# Patient Record
Sex: FEMALE | Race: BLACK OR AFRICAN AMERICAN | NOT HISPANIC OR LATINO | Employment: FULL TIME | URBAN - METROPOLITAN AREA
[De-identification: names, ages, dates, MRNs, and addresses within clinical notes are randomized per-mention and may not be internally consistent; named-entity substitution may affect disease eponyms.]

---

## 2017-10-17 ENCOUNTER — HOSPITAL ENCOUNTER (EMERGENCY)
Facility: HOSPITAL | Age: 37
Discharge: HOME | End: 2017-10-17
Attending: EMERGENCY MEDICINE
Payer: MEDICAID

## 2017-10-17 VITALS
SYSTOLIC BLOOD PRESSURE: 165 MMHG | RESPIRATION RATE: 18 BRPM | BODY MASS INDEX: 30.65 KG/M2 | HEIGHT: 63 IN | WEIGHT: 173 LBS | OXYGEN SATURATION: 99 % | DIASTOLIC BLOOD PRESSURE: 85 MMHG | HEART RATE: 94 BPM

## 2017-10-17 DIAGNOSIS — D64.9 ANEMIA, UNSPECIFIED TYPE: Primary | ICD-10-CM

## 2017-10-17 LAB
ALBUMIN SERPL BCP-MCNC: 3.4 G/DL
ALP SERPL-CCNC: 58 U/L
ALT SERPL W/O P-5'-P-CCNC: 10 U/L
ANION GAP SERPL CALC-SCNC: 7 MMOL/L
AST SERPL-CCNC: 15 U/L
BASOPHILS # BLD AUTO: 0.04 K/UL
BASOPHILS NFR BLD: 0.9 %
BILIRUB SERPL-MCNC: 0.2 MG/DL
BUN SERPL-MCNC: 8 MG/DL (ref 6–30)
BUN SERPL-MCNC: 9 MG/DL
CALCIUM SERPL-MCNC: 8.9 MG/DL
CHLORIDE SERPL-SCNC: 103 MMOL/L (ref 95–110)
CHLORIDE SERPL-SCNC: 105 MMOL/L
CO2 SERPL-SCNC: 27 MMOL/L
CREAT SERPL-MCNC: 0.6 MG/DL (ref 0.5–1.4)
CREAT SERPL-MCNC: 0.7 MG/DL
DIFFERENTIAL METHOD: ABNORMAL
EOSINOPHIL # BLD AUTO: 0.1 K/UL
EOSINOPHIL NFR BLD: 2.6 %
ERYTHROCYTE [DISTWIDTH] IN BLOOD BY AUTOMATED COUNT: 20.8 %
EST. GFR  (AFRICAN AMERICAN): >60 ML/MIN/1.73 M^2
EST. GFR  (NON AFRICAN AMERICAN): >60 ML/MIN/1.73 M^2
GLUCOSE SERPL-MCNC: 89 MG/DL (ref 70–110)
GLUCOSE SERPL-MCNC: 93 MG/DL
HCT VFR BLD AUTO: 26.7 %
HCT VFR BLD CALC: 27 %PCV (ref 36–54)
HGB BLD-MCNC: 6.8 G/DL
IMM GRANULOCYTES # BLD AUTO: 0.02 K/UL
IMM GRANULOCYTES NFR BLD AUTO: 0.4 %
LYMPHOCYTES # BLD AUTO: 1.3 K/UL
LYMPHOCYTES NFR BLD: 28.7 %
MCH RBC QN AUTO: 16.5 PG
MCHC RBC AUTO-ENTMCNC: 25.5 G/DL
MCV RBC AUTO: 65 FL
MONOCYTES # BLD AUTO: 0.5 K/UL
MONOCYTES NFR BLD: 9.9 %
NEUTROPHILS # BLD AUTO: 2.7 K/UL
NEUTROPHILS NFR BLD: 57.5 %
NRBC BLD-RTO: 0 /100 WBC
PLATELET # BLD AUTO: 264 K/UL
PMV BLD AUTO: ABNORMAL FL
POC IONIZED CALCIUM: 1.18 MMOL/L (ref 1.06–1.42)
POC TCO2 (MEASURED): 26 MMOL/L (ref 23–29)
POTASSIUM BLD-SCNC: 3.7 MMOL/L (ref 3.5–5.1)
POTASSIUM SERPL-SCNC: 3.7 MMOL/L
PROT SERPL-MCNC: 7.4 G/DL
RBC # BLD AUTO: 4.11 M/UL
SAMPLE: ABNORMAL
SODIUM BLD-SCNC: 140 MMOL/L (ref 136–145)
SODIUM SERPL-SCNC: 139 MMOL/L
WBC # BLD AUTO: 4.63 K/UL

## 2017-10-17 PROCEDURE — 99284 EMERGENCY DEPT VISIT MOD MDM: CPT | Mod: ,,, | Performed by: PHYSICIAN ASSISTANT

## 2017-10-17 PROCEDURE — 99283 EMERGENCY DEPT VISIT LOW MDM: CPT

## 2017-10-17 PROCEDURE — 85025 COMPLETE CBC W/AUTO DIFF WBC: CPT

## 2017-10-17 PROCEDURE — 80053 COMPREHEN METABOLIC PANEL: CPT

## 2017-10-17 RX ORDER — IBUPROFEN 800 MG/1
800 TABLET ORAL 3 TIMES DAILY
COMMUNITY
End: 2018-01-29 | Stop reason: ALTCHOICE

## 2017-10-17 RX ORDER — FERROUS SULFATE 325(65) MG
325 TABLET ORAL DAILY
Qty: 12 TABLET | Refills: 0 | Status: SHIPPED | OUTPATIENT
Start: 2017-10-17

## 2017-10-17 NOTE — ED TRIAGE NOTES
"Pt arrived to ED with CC of abnormal lab value - pt states "I think I need a blood transfusion."  Pt c/o weakness, fatigue, and heavy menstrual cycle. Denies CP. Pt reports palpitations and intermittent SOB. Pt unsure of fever but reports chills. Denies N/V. Denies blood in urine stool or sputum. Denies any pain.    Patient identifiers verified and correct for Amina Vazquez.    LOC: The patient is awake, alert and oriented x 4. Pt is speaking appropriately, no slurred speech.  APPEARANCE: Patient resting comfortably and in no acute distress. Pt is clean and well groomed. No JVD visible. Pt reports pain level of 0.  SKIN: Skin is warm dry and intact, and color is consistent with ethnicity. No tenting observed and capillary refill <3 seconds. No clubbing noted to nail beds. No breakdown or brusing visible and mucus membranes moist and acyanotic.  MUSCULOSKELETAL: Full range of motion present in all extremities. Hand  equal and leg strength strong +2 bilaterally.  RESPIRATORY: Pt c/o intermittent SOB. Airway is open and patent. Respirations-unlabored, regular rate, equal bilaterally on inspiration and expiration. No accessory muscle use noted. Lungs clear to auscultation in all fields bilaterally anterior and posterior.   CARDIAC: Pt c/o palpitations. Patient has regular heart rate and rhythm.  No peripheral edema noted, and patient has no c/o chest pain.  ABDOMEN: Soft and non-tender to palpation with no distention noted. Normoactive bowel sounds X4 quadrants. Pt has no complaints of abnormal bowel movements. Pt reports normal appetite.   NEUROLOGIC: Eyes open spontaneously and facial expression symmetrical. Pt behavior appropriate to situation, and pt follows commands.  Pt reports sensation present in all extremities when touched with a finger. No s/s of ischemic stroke. PERRLA  : No complaints of frequency, burning, urgency or blood in the urine. Pt currently on menstrual cycle and reports heavy " bleeding with clots - reports hx of blood transfusion r/t blood loss during menstrual cycle.

## 2017-10-17 NOTE — ED PROVIDER NOTES
"Encounter Date: 10/17/2017       History     Chief Complaint   Patient presents with    Abnormal Lab     per patient " I had a finger stick hematocrit and it was a "6" i dont know exactly"  pt states "I think I may need a blood transfusion, but maybe yall should take my blood work first.  . "     Patient is a 36-year-old female with past medical history of anemia requiring transfusions in the past and hypertension who presents to the emergency department due to a 1 month history of worsening shortness of breath and fatigue.  Patient states she has a history of heavy cycles and has had a heavy cycle this month.  Patient states that she has been having worsening shortness of breath and fatigued and associates this with potential anemia.  Patient states she has had shortness of breath in the past where she passed out at home and 2013 and required a transfusion and was found to be anemic.  Patient denies any fevers, chills, chest pain, nausea, vomiting, or any other complaints.        Review of patient's allergies indicates:  No Known Allergies  Past Medical History:   Diagnosis Date    Anemia     Hypertension      Past Surgical History:   Procedure Laterality Date     SECTION       Family History   Problem Relation Age of Onset    Hypertension Mother     Diabetes Neg Hx     Cancer Neg Hx     Stroke Neg Hx     Heart disease Neg Hx      Social History   Substance Use Topics    Smoking status: Never Smoker    Smokeless tobacco: Never Used    Alcohol use No     Review of Systems   Constitutional: Negative for activity change, appetite change, diaphoresis, fatigue and fever.   HENT: Negative for congestion, dental problem, drooling, ear pain, facial swelling, sore throat and trouble swallowing.    Eyes: Negative for pain, discharge and visual disturbance.   Respiratory: Positive for shortness of breath. Negative for apnea, cough and chest tightness.    Cardiovascular: Negative for chest pain and " palpitations.   Gastrointestinal: Negative for abdominal distention, anal bleeding, blood in stool, diarrhea, nausea and vomiting.   Endocrine: Negative for cold intolerance and polydipsia.   Genitourinary: Positive for menstrual problem. Negative for decreased urine volume, difficulty urinating, enuresis, frequency and hematuria.   Musculoskeletal: Negative for arthralgias, gait problem, myalgias and neck stiffness.   Skin: Negative for color change and pallor.   Allergic/Immunologic: Negative for environmental allergies.   Neurological: Positive for light-headedness. Negative for dizziness, syncope, numbness and headaches.   Psychiatric/Behavioral: Negative for agitation, confusion and dysphoric mood.       Physical Exam     Initial Vitals [10/17/17 1636]   BP Pulse Resp Temp SpO2   (!) 165/85 94 18 -- 99 %      MAP       111.67         Physical Exam    Nursing note and vitals reviewed.  Constitutional: She appears well-developed and well-nourished. She is not diaphoretic. No distress.   HENT:   Head: Normocephalic and atraumatic.   Neck: Normal range of motion. Neck supple.   Cardiovascular: Normal rate, regular rhythm and normal heart sounds. Exam reveals no gallop and no friction rub.    No murmur heard.  Pulmonary/Chest: Breath sounds normal. She has no wheezes. She has no rhonchi. She has no rales.   Abdominal: Soft. Bowel sounds are normal. There is no tenderness. There is no rebound and no guarding.   Musculoskeletal: Normal range of motion.   Neurological: She is alert and oriented to person, place, and time.   Skin: Skin is warm and dry. No rash noted. No erythema.   Psychiatric: She has a normal mood and affect.         ED Course   Procedures  Labs Reviewed   CBC W/ AUTO DIFFERENTIAL - Abnormal; Notable for the following:        Result Value    Hemoglobin 6.8 (*)     Hematocrit 26.7 (*)     MCV 65 (*)     MCH 16.5 (*)     MCHC 25.5 (*)     RDW 20.8 (*)     All other components within normal limits    COMPREHENSIVE METABOLIC PANEL - Abnormal; Notable for the following:     Albumin 3.4 (*)     Anion Gap 7 (*)     All other components within normal limits   ISTAT PROCEDURE - Abnormal; Notable for the following:     POC Hematocrit 27 (*)     All other components within normal limits   ISTAT CHEM8                   APC / Resident Notes:   Patient is a 36-year-old female with past medical history of anemia requiring transfusions in the past and hypertension who presents to the emergency department due to a 1 month history of worsening shortness of breath and fatigue.  Physical exam reveals female no acute distress.  Heart regular rate and rhythm.  Lungs clear to auscultation bilaterally.  Abdomen soft nontender nondistended.  Will obtain CBC and CMP.  Will transfuse if necessary.    CBC shows hemoglobin of 6.8 and hematocrit 26.7.  In reviewing patient's chart hemoglobin appears at baseline.  Patient states that she has not been taking any iron supplements due to previous constipation.  Patient will be discharged home on iron supplementation and given follow-up information for hematology as well as OB/GYN for further treatment as an outpatient.  Patient states she is interested in iron infusions given previous side effects of by mouth iron.  Plan of treatment discussed with attending physician and she is agreeable.        Scribe Attestation:   Comments:         Attending Attestation:     Physician Attestation Statement for NP/PA:   I discussed this assessment and plan of this patient with the NP/PA, but I did not personally examine the patient. The face to face encounter was performed by the NP/PA.    Other NP/PA Attestation Additions:      Medical Decision Making: Chronic anemia                 ED Course      Clinical Impression:   The encounter diagnosis was Anemia, unspecified type.    Disposition:   Disposition: Discharged  Condition: Stable                        Katelynn Mcdonnell PA-C  10/17/17 0449        Maia Mcnamara MD  10/20/17 6562

## 2018-01-29 ENCOUNTER — OFFICE VISIT (OUTPATIENT)
Dept: OCCUPATIONAL MEDICINE | Facility: CLINIC | Age: 38
End: 2018-01-29
Payer: COMMERCIAL

## 2018-01-29 VITALS
DIASTOLIC BLOOD PRESSURE: 102 MMHG | TEMPERATURE: 98 F | WEIGHT: 169 LBS | RESPIRATION RATE: 18 BRPM | HEIGHT: 63 IN | HEART RATE: 82 BPM | SYSTOLIC BLOOD PRESSURE: 142 MMHG | OXYGEN SATURATION: 98 % | BODY MASS INDEX: 29.95 KG/M2

## 2018-01-29 DIAGNOSIS — S00.83XA CONTUSION OF FACE, INITIAL ENCOUNTER: ICD-10-CM

## 2018-01-29 DIAGNOSIS — S09.93XA FACIAL INJURY, INITIAL ENCOUNTER: Primary | ICD-10-CM

## 2018-01-29 PROCEDURE — 99214 OFFICE O/P EST MOD 30 MIN: CPT | Mod: S$GLB,,, | Performed by: NURSE PRACTITIONER

## 2018-01-29 RX ORDER — HYDROCHLOROTHIAZIDE 25 MG/1
TABLET ORAL
Refills: 0 | COMMUNITY
Start: 2017-12-22 | End: 2023-05-25

## 2018-01-29 RX ORDER — IBUPROFEN 600 MG/1
600 TABLET ORAL EVERY 6 HOURS PRN
Qty: 40 TABLET | Refills: 0 | Status: SHIPPED | OUTPATIENT
Start: 2018-01-29 | End: 2018-02-08

## 2018-01-30 NOTE — PATIENT INSTRUCTIONS
Head Injury (Adult)    You have a head injury. It does not appear serious at this time. But symptoms of a more serious problem, such as a mild brain injury (concussion) or bruising or bleeding in the brain, may appear later. For this reason, you or someone caring for you will need to watch for the symptoms listed below. Once youre home, also be sure to follow any care instructions youre given.  Home care  Watch for the following symptoms  Seek emergency medical care if you have any of these symptoms over the next hours to days:   · Headache  · Nausea or vomiting  · Dizziness  · Sensitivity to light or noise  · Unusual sleepiness or grogginess  · Trouble falling asleep  · Personality changes  · Vision changes  · Memory loss  · Confusion  · Trouble walking or clumsiness  · Loss of consciousness (even for a short time)  · Inability to be awakened  · Stiff neck  · Weakness or numbness in any part of the body  · Seizures  General care  · If you were prescribed medicines for pain, use them as directed. Note: Dont take other medicines for pain without talking to your provider first.  · To help reduce swelling and pain, apply a cold source to the injured area for up to 20 minutes at a time. Do this as often as directed. Use a cold pack or bag of ice wrapped in a thin towel. Never apply a cold source directly to the skin.  · If you have cuts or scrapes as a result of your head injury, care for them as directed.  · For the next 24 hours (or longer, if instructed):  ¨ Dont drink alcohol or use sedatives or other medicines that make you sleepy.  ¨ Dont drive or operate machinery.  ¨ Dont do anything strenuous, such as heavy lifting or straining.  ¨ Limit tasks that require concentration. This includes reading, using a smartphone or computer, watching TV, and playing video games.  ¨ Dont return to sports or other activities that could result in another head injury.  Follow-up care  Follow up with your healthcare  provider, or as directed. If imaging tests were done, they will be reviewed by a doctor. You will be told the results and any new findings that may affect your care.  When to seek medical advice  Call your healthcare provider right away if any of these occur:  · Pain doesnt get better or worsens  · New or increased swelling or bruising  · Fever of 100.4°F (38°C) or higher, or as directed by your provider  · Increased redness, warmth, drainage, or bleeding from the injured area  · Fluid drainage or bleeding from the nose or ears  · Any depression or bony abnormality in the injured area  Date Last Reviewed: 9/26/2015 © 2000-2017 [employer]. [address] [place] [address], [place] [address]. All rights reserved. This information is not intended as a substitute for professional medical care. Always follow your healthcare professional's instructions.        Facial Contusion  A contusion is another word for a bruise. It happens when small blood vessels break open and leak blood into the nearby area. A facial contusion can result from a bump, hit, or fall. This may happen during sports or an accident. Symptoms of a contusion often include changes in skin color (bruising), swelling, and pain.   The swelling from the contusion should decrease in a few days. Bruising and pain may take several weeks to go away.   Home care  · If you have been prescribed medicines for pain, take them as directed.  · To help reduce swelling and pain, wrap a cold pack or bag of frozen peas in a thin towel. Put it on the injured area for up to 20 minutes. Do this a few times a day until the swelling goes down.   · If you have scrapes or cuts on your face requiring stiches or other closures, care for them as directed.  · For the next 24 hours (or longer if instructed):  ¨ Dont drink alcohol, or use sedatives or medicines that make you sleepy.  ¨ Dont drive or operate machinery.  ¨ Avoid doing anything strenuous. Dont lift or  strain.  ¨ Do not return to sports or other activity that could result in another head injury.  Note about concussion  Because the injury was to your head, it is possible that a concussion (mild brain injury) could result. You don't have signs of a concussion at this time. But symptoms can show up later. Be alert for signs and symptoms of a concussion. Seek emergency medical care if any of these develop over the next hours to days:  · Headache  · Nausea or vomiting  · Dizziness  · Sensitivity to light or noise  · Unusual sleepiness or grogginess  · Trouble falling asleep  · Personality changes  · Vision changes  · Memory loss  · Confusion  · Trouble walking or clumsiness  · Loss of consciousness (even for a short time)  · Inability to be awakened   Follow-up care  Follow up with your healthcare provider or our staff as directed.  When to seek medical advice  Call your healthcare provider right away if any of these occur:  · Swelling or pain that gets worse, not better  · New swelling or pain  · Warmth or drainage from the swollen area or from cuts or scrapes  · Fluid drainage or bleeding from the nose or ears  · Fever of 100.4ºF (38ºC) or higher, or as directed by your healthcare provider  Date Last Reviewed: 5/7/2015  © 1489-0879 [employer]. [address] [place] [address], [place] [address]. All rights reserved. This information is not intended as a substitute for professional medical care. Always follow your healthcare professional's instructions.

## 2018-01-30 NOTE — PROGRESS NOTES
"Subjective:       Patient ID: Amina Vazquez is a 37 y.o. female.    Vitals:  height is 5' 3" (1.6 m) and weight is 76.7 kg (169 lb). Her temperature is 98.2 °F (36.8 °C). Her blood pressure is 154/100 (abnormal) and her pulse is 82. Her respiration is 18 and oxygen saturation is 98%.     Chief Complaint: Facial Injury (on january 25th)    Pt was working at [employer] when a faucet display fell and hit her below her nose on the left side of her face. Pt says her face was swollen after the fact and that she had headaches. Pt says the headaches are still present and blurry vision. Pt said she did not bleed at all though. Pt says she felt light headed after it happened. Pt has taken excedrin and ibuprofen yesterday. Pt is a local that works at [employer] [place]      Facial Injury    The incident occurred 3 to 5 days ago. The injury mechanism was a direct blow. There was no loss of consciousness. There was no blood loss. The quality of the pain is described as aching. The pain is at a severity of 9/10. The pain is moderate. The pain has been constant since the injury. Associated symptoms include blurred vision and headaches. Pertinent negatives include no memory loss, numbness, tinnitus, vomiting or weakness. She has tried NSAIDs for the symptoms. The treatment provided mild relief.     Review of Systems   Constitution: Negative for chills, decreased appetite and weakness.   HENT: Negative for congestion and tinnitus.    Eyes: Positive for blurred vision.   Respiratory: Negative for cough.    Gastrointestinal: Negative for constipation, diarrhea, nausea and vomiting.   Neurological: Positive for headaches and light-headedness. Negative for numbness.   Psychiatric/Behavioral: Negative for memory loss.       Objective:      Physical Exam   Constitutional: She is oriented to person, place, and time. She appears well-developed and well-nourished. She is cooperative.  Non-toxic appearance. She does not appear ill. No " "distress.   HENT:   Head: Normocephalic and atraumatic. Head is without abrasion, without contusion and without laceration.       Right Ear: Hearing, tympanic membrane, external ear and ear canal normal. No hemotympanum.   Left Ear: Hearing, tympanic membrane, external ear and ear canal normal. No hemotympanum.   Nose: Nose normal. No mucosal edema, rhinorrhea or nasal deformity. No epistaxis. Right sinus exhibits no maxillary sinus tenderness and no frontal sinus tenderness. Left sinus exhibits no maxillary sinus tenderness and no frontal sinus tenderness.   Mouth/Throat: Uvula is midline, oropharynx is clear and moist and mucous membranes are normal. No trismus in the jaw. Normal dentition. No uvula swelling. No posterior oropharyngeal erythema.   Patient reports that her symptoms were so bad on Thursday and Friday that she asked to go to an Ed, however she was told she needed to go to "our doctors".  She waited to come to the Urgent care today.  Only complaint at this time is upper jaw pain.  Other symptoms have resolved    Eyes: Conjunctivae, EOM and lids are normal. Pupils are equal, round, and reactive to light. Right eye exhibits no discharge. Left eye exhibits no discharge. No scleral icterus.   Sclera clear bilat   Neck: Trachea normal, normal range of motion, full passive range of motion without pain and phonation normal. Neck supple. No spinous process tenderness and no muscular tenderness present. No neck rigidity. No tracheal deviation present.   Cardiovascular: Normal rate, regular rhythm, normal heart sounds, intact distal pulses and normal pulses.    Pulmonary/Chest: Effort normal and breath sounds normal. No respiratory distress.   Abdominal: Soft. Normal appearance and bowel sounds are normal. She exhibits no distension, no pulsatile midline mass and no mass. There is no tenderness.   Musculoskeletal: Normal range of motion. She exhibits no edema or deformity.   Neurological: She is alert and " oriented to person, place, and time. She has normal strength. No cranial nerve deficit or sensory deficit. She exhibits normal muscle tone. She displays no seizure activity. Coordination normal. GCS eye subscore is 4. GCS verbal subscore is 5. GCS motor subscore is 6.   No neuro deficits noted today .  Incident happened On Thursday early afternoon and she stayed at work for 2 additional hours    Skin: Skin is warm, dry and intact. Capillary refill takes less than 2 seconds. No abrasion, no bruising, no burn, no ecchymosis and no laceration noted. She is not diaphoretic. No pallor.   Psychiatric: She has a normal mood and affect. Her speech is normal and behavior is normal. Judgment and thought content normal. Cognition and memory are normal.   Nursing note and vitals reviewed.    No fracture noted to facial bones.  Minimal fluid noted to sinuses.   Assessment:       1. Facial injury, initial encounter    2. Contusion of face, initial encounter        Plan:         Facial injury, initial encounter  -     X-Ray Facial Bones  3 Or More View; Future; Expected date: 01/29/2018    Contusion of face, initial encounter

## 2018-06-27 ENCOUNTER — TELEPHONE (OUTPATIENT)
Dept: OBSTETRICS AND GYNECOLOGY | Facility: CLINIC | Age: 38
End: 2018-06-27

## 2018-06-27 DIAGNOSIS — N91.2 AMENORRHEA: Primary | ICD-10-CM

## 2018-06-27 NOTE — TELEPHONE ENCOUNTER
----- Message from Mona Guthrie sent at 6/27/2018  1:41 PM CDT -----  Contact: VERONICA XIE [[id number]]      Can the clinic reply in [facility]: no    Who Called: VERONICA XIE [[id number]]    Date of Positive Preg Test: 6/18    1st day of Last Menstrual Cycle: 5/21    List Any Difficulties: no    What Number to Call Back: [phone]

## 2018-07-02 ENCOUNTER — TELEPHONE (OUTPATIENT)
Dept: OBSTETRICS AND GYNECOLOGY | Facility: CLINIC | Age: 38
End: 2018-07-02

## 2018-07-02 NOTE — TELEPHONE ENCOUNTER
----- Message from Prachi Swain sent at 7/2/2018  3:13 PM CDT -----  Contact: Self   New Ob LMP 04/21/2018 Call back number is [phone].She was a pt of [facility] before

## 2018-07-02 NOTE — TELEPHONE ENCOUNTER
Unable to reach patient on this matter. LMOVM to call the office back        Everything entered with last weeks call

## 2018-07-10 ENCOUNTER — TELEPHONE (OUTPATIENT)
Dept: OBSTETRICS AND GYNECOLOGY | Facility: CLINIC | Age: 38
End: 2018-07-10

## 2018-07-10 NOTE — TELEPHONE ENCOUNTER
----- Message from Yvette Moscoso sent at 7/10/2018 12:59 PM CDT -----  Contact: Amina  Name of Who is Calling: Amina      What is the request in detail: Patient was returning a missed call back from the staff concerning her inital ob appointment       Can the clinic reply by [facility]: no      What Number to Call Back if not in [facility]: [phone]

## 2018-07-18 ENCOUNTER — TELEPHONE (OUTPATIENT)
Dept: OBSTETRICS AND GYNECOLOGY | Facility: CLINIC | Age: 38
End: 2018-07-18

## 2018-07-18 NOTE — TELEPHONE ENCOUNTER
----- Message from Mona Guthrie sent at 7/18/2018 10:59 AM CDT -----  Contact: VERONICA XIE [[id number]]            Name of Who is Calling: VERONICA XIE [[id number]]      What is the request in detail:  Pt returning call back to schedule first initial appointment       Can the clinic reply by [facility]: no      What Number to Call Back if not in [facility]: [phone]

## 2018-08-01 ENCOUNTER — PROCEDURE VISIT (OUTPATIENT)
Dept: OBSTETRICS AND GYNECOLOGY | Facility: CLINIC | Age: 38
End: 2018-08-01
Payer: MEDICAID

## 2018-08-01 DIAGNOSIS — O36.80X0 ENCOUNTER TO DETERMINE FETAL VIABILITY OF PREGNANCY, SINGLE OR UNSPECIFIED FETUS: ICD-10-CM

## 2018-08-01 DIAGNOSIS — N91.2 AMENORRHEA: ICD-10-CM

## 2018-08-01 DIAGNOSIS — Z36.89 ESTABLISH GESTATIONAL AGE, ULTRASOUND: ICD-10-CM

## 2018-08-01 PROCEDURE — 76801 OB US < 14 WKS SINGLE FETUS: CPT | Mod: PBBFAC | Performed by: OBSTETRICS & GYNECOLOGY

## 2018-08-01 PROCEDURE — 76801 OB US < 14 WKS SINGLE FETUS: CPT | Mod: 26,S$PBB,, | Performed by: OBSTETRICS & GYNECOLOGY

## 2018-08-01 NOTE — PROCEDURES
Procedures   Obstetrical ultrasound completed today.  See report in imaging section of [facility].

## 2018-08-17 ENCOUNTER — TELEPHONE (OUTPATIENT)
Dept: OBSTETRICS AND GYNECOLOGY | Facility: CLINIC | Age: 38
End: 2018-08-17

## 2018-08-17 NOTE — TELEPHONE ENCOUNTER
Informed patient that dr linares is not taking new ob patients at this time. Patient was scheduled with Dr. Pedersen

## 2018-08-17 NOTE — TELEPHONE ENCOUNTER
----- Message from Charlotte Bell sent at 8/17/2018  6:58 AM CDT -----  Contact: self  Message from [facility], System Message sent at 8/16/2018 11:01 AM CDT -----    Appointment Request From: Amina Vazquez    With Provider: Dr Liana Valdes    Preferred Date Range: Any    Preferred Times: Any time    Reason for visit: Existing Patient    Comments:  Prenatal

## 2018-09-10 ENCOUNTER — TELEPHONE (OUTPATIENT)
Dept: OBSTETRICS AND GYNECOLOGY | Facility: CLINIC | Age: 38
End: 2018-09-10

## 2018-09-10 NOTE — TELEPHONE ENCOUNTER
----- Message from Vivi Burger sent at 9/10/2018  1:02 PM CDT -----  Contact: pt   Can the clinic reply in [facility]: YES     Who Called: VERONICA XIE [[id number]]     Date of Positive Preg Test: 8/1/18      1st day of Last Menstrual Cycle: 05/21     List Any Difficulties: Fibroids      What Number to Call Back: [phone].

## 2018-09-18 ENCOUNTER — INITIAL PRENATAL (OUTPATIENT)
Dept: OBSTETRICS AND GYNECOLOGY | Facility: CLINIC | Age: 38
End: 2018-09-18
Payer: MEDICAID

## 2018-09-18 VITALS
BODY MASS INDEX: 32.02 KG/M2 | DIASTOLIC BLOOD PRESSURE: 78 MMHG | SYSTOLIC BLOOD PRESSURE: 132 MMHG | WEIGHT: 180.75 LBS

## 2018-09-18 DIAGNOSIS — Z98.891 HISTORY OF CESAREAN DELIVERY: ICD-10-CM

## 2018-09-18 DIAGNOSIS — I10 CHRONIC HYPERTENSION: ICD-10-CM

## 2018-09-18 DIAGNOSIS — Z34.81 NORMAL PREGNANCY IN MULTIGRAVIDA IN FIRST TRIMESTER: ICD-10-CM

## 2018-09-18 DIAGNOSIS — Z32.01 POSITIVE PREGNANCY TEST: Primary | ICD-10-CM

## 2018-09-18 DIAGNOSIS — Z36.89 ENCOUNTER FOR FETAL ANATOMIC SURVEY: ICD-10-CM

## 2018-09-18 PROCEDURE — 99213 OFFICE O/P EST LOW 20 MIN: CPT | Mod: PBBFAC,TH | Performed by: OBSTETRICS & GYNECOLOGY

## 2018-09-18 PROCEDURE — 99203 OFFICE O/P NEW LOW 30 MIN: CPT | Mod: S$PBB,TH,, | Performed by: OBSTETRICS & GYNECOLOGY

## 2018-09-18 PROCEDURE — 99999 PR PBB SHADOW E&M-EST. PATIENT-LVL III: CPT | Mod: PBBFAC,,, | Performed by: OBSTETRICS & GYNECOLOGY

## 2018-09-18 RX ORDER — NIFEDIPINE 30 MG/1
TABLET, EXTENDED RELEASE ORAL
Refills: 1 | COMMUNITY
Start: 2018-07-03 | End: 2023-05-25

## 2018-09-18 RX ORDER — NITROFURANTOIN 25; 75 MG/1; MG/1
CAPSULE ORAL
Refills: 0 | COMMUNITY
Start: 2018-07-03

## 2018-09-18 NOTE — PROGRESS NOTES
"CC: Amenorrhea     HPI: Amina Vazquez is a 37 y.o. , presents today for above. Has not seen any other provider for this pregnancy. Patient's last menstrual period was 2018. (exact date).     Not planned pregnancy. Not using birth control. Had dating ultrasound earlier this pregnancy on 18, 11 weeks - consistent with LMP. Has history of  delivery x 2. First  was for "genital warts." Second was elective repeat  delivery.     Has HTN, on HCTZ 25 mg daily and Nifedipine. New diagnosis since last pregnancy.      Complains of discomfort at night. Takes gas-ex at night. Having increased pressure. No nausea, vomiting, bleeding.      SOCIAL HISTORY: Works at [employer]. Boyfriend, Quinton, is a contractor. Denies emotional/mental/physical/sexual violence or abuse. Feels safe at home. Accompanied today by her daughter. Some stress at home as her 19 yo daughter just moved out. They have been through counseling before.     GYN History:  Last pap: 2016 normal   Denies history of abnormal pap smears.   Denies history of hepatitis B, C, HIV, syphilis, gonorrhea/chlamydia.   Denies History of genital HSV. Does report a history of genital warts (which was reportedly indication for C/S in G1). No warts since that pregnancy.   Cycles regular, every 28 days    Review of patient's allergies indicates:  No Known Allergies  Past Medical History:   Diagnosis Date    Anemia     Hypertension      Past Surgical History:   Procedure Laterality Date     SECTION      x 2     Past Surgical History:   Procedure Laterality Date     SECTION      x 2     OB History    Para Term  AB Living   4 2 2   1 2   SAB TAB Ectopic Multiple Live Births                  # Outcome Date GA Lbr Ricco/2nd Weight Sex Delivery Anes PTL Lv   4 Current            3 AB            2 Term            1 Term                 OB History      Para Term  AB Living    4 2 2          SAB TAB " Ectopic Multiple Live Births                     Social History     Socioeconomic History    Marital status: Single     Spouse name: Not on file    Number of children: Not on file    Years of education: Not on file    Highest education level: Not on file   Social Needs    Financial resource strain: Not on file    Food insecurity - worry: Not on file    Food insecurity - inability: Not on file    Transportation needs - medical: Not on file    Transportation needs - non-medical: Not on file   Occupational History    Not on file   Tobacco Use    Smoking status: Never Smoker    Smokeless tobacco: Never Used   Substance and Sexual Activity    Alcohol use: No    Drug use: No    Sexual activity: Yes     Partners: Female   Other Topics Concern    Not on file   Social History Narrative    Not on file     Family History   Problem Relation Age of Onset    Hypertension Mother     Diabetes Neg Hx     Cancer Neg Hx     Stroke Neg Hx     Heart disease Neg Hx      Social History     Substance and Sexual Activity   Sexual Activity Yes    Partners: Female     GENETIC SCREENING   Patient's age 35 years or older as of estimated date of delivery? yes  Nural tube defect (meningomyelocele, spina bifida, or anencephaly)? No  Down syndrome? No  Yonis-Sachs (Ashkenazi Sikhism, Cajun, Japanese [place])? No  Sickle cell disease or trait ()? No  Hemophilia or other blood disorders? No  Cystic fibrosis? No  Muscular dystrophy? No  Jennifer's chorea? No  Thalassemia (Italian, Greek, Mediterranean, or  background) MCV less than 80? No  Congenital heart defect? No  Mental retardation/autism? No  Other inherited genetic or chromosomal disorder? No  Maternal metabolic disorder (e.g. type 1 diabetes, PKU)? No  Patient or baby's father had a child with birth defects not listed above? No  Recurrent pregnancy loss or a stillbirth: No  Medications (including supplements, vitamins, herbs or OTC drugs)/illicit/recreational  drugs/alcohol since last menstrual period? None    ROS:    Constitutional/Gen: Feeling well  Psych: Some stress   CV/vasc: Denies heart palpitations or edema  Resp: Denies SOB or dyspnea  GI: Denies constipation, diarrhea, or vomiting. No nausea.  : Some lower abdominal discomfort   MS: Denies weakness, soreness, or changes in ROM    OBJECTIVE:  /78   Wt 82 kg (180 lb 12.4 oz)   LMP 2018   BMI 32.02 kg/m²   Constitutional/Gen: NAD  Head: normocephalic  Skin: warm and dry w/o rash  Lung: No increased work of breathing  Heart: Normal heart rateT  Abdomen: soft, non tender, no hernia, well healed pfannenstiel incisions  Pelvic exam: deferred  Extremities: normal ROM  Neurologic: A&O x 4  Psych: affect appropriate and without signs of mood, thought or memory difficulty appreciated    ASSESSMENT: 37 y.o. female  with amenorrhea. Pregnancy at 17w1d by LMP c/w 11 week scan.     PLAN:  1. Amenorrhea  -- Routine serum and urine prenatal labs today     2. Routine Gyn care  -- Last pap , normal per patient. Unclear if she had HPV co-testing.   -- Needs pap and pelvic exam at next visit.     3.  Body mass index is 32.02 kg/m².     Underweight Less than 18.5 28-40    Normal Weight 18.5-24.9  25-35    Overweight 25-29.9  15-25    Obese   30 and greater  11-20     Patient Active Problem List    Diagnosis Date Noted    Normal pregnancy in multigravida in first trimester 2018     Dated by: LMP consistent with 11 week US  Rh positive  PP Birth Control: Considering tubal ligation. Need to continue to discuss.  NOB visit at 17 weeks. Desires QUAD screen.             Chronic hypertension 2018     New diagnosis, did not have with her other pregnancies  Currently on HCTZ 25 mg daily  and Nifedipine 30 mg XR daily, will continue  #Recommend ASA 81 mg for pre-eclampsia risk reduction  #Will obtain baseline labs, CBC, CMP and urine p/c  #We discussed heightened surveillance in third trimester with  "growth scans and  testing. Delivery pending control.       History of  delivery 2018     History of C/S in G1 for "genital warts"  Elective repeat in G2      Uterine fibroid 2016    Iron deficiency anemia due to chronic blood loss 2013    Pica in adults 2013     RTO in 3 weeks for anatomy scan and prenatal visit.     Tabatha Wray MD  Obstetrics and Gynecology  [facility]          "

## 2018-10-09 ENCOUNTER — PROCEDURE VISIT (OUTPATIENT)
Dept: MATERNAL FETAL MEDICINE | Facility: CLINIC | Age: 38
End: 2018-10-09
Payer: MEDICAID

## 2018-10-09 DIAGNOSIS — Z36.89 ENCOUNTER FOR FETAL ANATOMIC SURVEY: ICD-10-CM

## 2018-10-09 DIAGNOSIS — O34.12 UTERINE FIBROIDS AFFECTING PREGNANCY IN SECOND TRIMESTER: ICD-10-CM

## 2018-10-09 DIAGNOSIS — Z36.89 ENCOUNTER FOR ULTRASOUND TO CHECK FETAL GROWTH: Primary | ICD-10-CM

## 2018-10-09 DIAGNOSIS — O09.522 ELDERLY MULTIGRAVIDA IN SECOND TRIMESTER: ICD-10-CM

## 2018-10-09 DIAGNOSIS — O10.912 PRE-EXISTING HYPERTENSION DURING PREGNANCY IN SECOND TRIMESTER, UNSPECIFIED PRE-EXISTING HYPERTENSION TYPE: ICD-10-CM

## 2018-10-09 DIAGNOSIS — D25.9 UTERINE FIBROIDS AFFECTING PREGNANCY IN SECOND TRIMESTER: ICD-10-CM

## 2018-10-09 PROCEDURE — 99499 UNLISTED E&M SERVICE: CPT | Mod: S$PBB,,, | Performed by: OBSTETRICS & GYNECOLOGY

## 2018-10-09 PROCEDURE — 76811 OB US DETAILED SNGL FETUS: CPT | Mod: PBBFAC | Performed by: OBSTETRICS & GYNECOLOGY

## 2018-10-09 PROCEDURE — 76811 OB US DETAILED SNGL FETUS: CPT | Mod: 26,S$PBB,, | Performed by: OBSTETRICS & GYNECOLOGY

## 2018-10-14 PROBLEM — O09.522 ELDERLY MULTIGRAVIDA IN SECOND TRIMESTER: Status: ACTIVE | Noted: 2018-10-14

## 2018-10-15 ENCOUNTER — TELEPHONE (OUTPATIENT)
Dept: OBSTETRICS AND GYNECOLOGY | Facility: CLINIC | Age: 38
End: 2018-10-15

## 2018-10-15 NOTE — TELEPHONE ENCOUNTER
Patient purposely missed appointments. She walk into clinic and stated she wanted a new doctor, because she didn't like . She schedule at  with another provider, message routed to .

## 2018-10-15 NOTE — TELEPHONE ENCOUNTER
----- Message from Tabatha Wray MD sent at 10/14/2018 10:22 AM CDT -----  This patient never had her prenatal labs drawn. Does someone mind calling her to see if she can get those done?     Thanks!    Tabatha Wray MD  Obstetrics and Gynecology  [facility]

## 2018-10-16 ENCOUNTER — TELEPHONE (OUTPATIENT)
Dept: OBSTETRICS AND GYNECOLOGY | Facility: CLINIC | Age: 38
End: 2018-10-16

## 2018-10-16 NOTE — TELEPHONE ENCOUNTER
----- Message from Tabatha Wray MD sent at 10/15/2018  7:12 PM CDT -----  Hi,    I called ilda tonight to double check about her prenatal labs. She is hoping to reschedule these. She also needs a fu prenatal visit. I am happy to see her, (or it can be with anyone else if she prefers :)). Do you mind calling her tomorrow to arrange the labs, prenatal visit?    Thanks!  Tabatha Wray MD  Obstetrics and Gynecology  [facility]

## 2019-01-02 ENCOUNTER — TELEPHONE (OUTPATIENT)
Dept: OBSTETRICS AND GYNECOLOGY | Facility: CLINIC | Age: 39
End: 2019-01-02

## 2019-01-02 NOTE — TELEPHONE ENCOUNTER
Called patient to set up a prenatal visit; no answer. Left patient a message to return call for further assistance with scheduling appointment.

## 2019-01-02 NOTE — TELEPHONE ENCOUNTER
----- Message from Tabatha Wray MD sent at 1/2/2019 11:14 AM CST -----  Contact: [facility]  Thank you! Does someone mind calling her to see if she would like to schedule a prenatal visit. I don't think she has been seen since September.     Thanks!  Tabatha    ----- Message -----  From: Jordan Dorman MA  Sent: 1/2/2019   8:40 AM  To: Tabatha Wray MD        ----- Message -----  From: Angélica Celaya  Sent: 1/2/2019   8:12 AM  To: Jeansonne Julie Staff    Pt was a no show for her [facility] appt. sh

## 2020-01-09 ENCOUNTER — HOSPITAL ENCOUNTER (EMERGENCY)
Facility: OTHER | Age: 40
Discharge: HOME | End: 2020-01-09
Attending: EMERGENCY MEDICINE
Payer: MEDICAID

## 2020-01-09 VITALS
HEIGHT: 63 IN | DIASTOLIC BLOOD PRESSURE: 84 MMHG | TEMPERATURE: 99 F | BODY MASS INDEX: 29.59 KG/M2 | RESPIRATION RATE: 16 BRPM | OXYGEN SATURATION: 100 % | HEART RATE: 71 BPM | SYSTOLIC BLOOD PRESSURE: 165 MMHG | WEIGHT: 167 LBS

## 2020-01-09 DIAGNOSIS — I10 ESSENTIAL HYPERTENSION: ICD-10-CM

## 2020-01-09 DIAGNOSIS — R03.0 ELEVATED BLOOD PRESSURE READING: ICD-10-CM

## 2020-01-09 DIAGNOSIS — R51.9 FRONTAL HEADACHE: Primary | ICD-10-CM

## 2020-01-09 LAB
B-HCG UR QL: NEGATIVE
CTP QC/QA: YES

## 2020-01-09 PROCEDURE — 25000003 PHARM REV CODE 250: Performed by: EMERGENCY MEDICINE

## 2020-01-09 PROCEDURE — 99283 EMERGENCY DEPT VISIT LOW MDM: CPT

## 2020-01-09 PROCEDURE — 81025 URINE PREGNANCY TEST: CPT | Performed by: EMERGENCY MEDICINE

## 2020-01-09 RX ORDER — NAPROXEN 500 MG/1
500 TABLET ORAL
Status: COMPLETED | OUTPATIENT
Start: 2020-01-09 | End: 2020-01-09

## 2020-01-09 RX ADMIN — NAPROXEN 500 MG: 500 TABLET ORAL at 03:01

## 2020-01-09 NOTE — ED TRIAGE NOTES
"Pt presents to ED with c/o a generalized headache that feels "like a bunch of built up pressure". Reports I can tell when my pressure is high, I get this headache and get a little dizzy. States she took Excedrin about an hour ago and her headache is starting to improve. Denies fever, N/V/D, chest pain, SOB, blurred vision, and palpitations  "

## 2020-01-09 NOTE — ED PROVIDER NOTES
"Encounter Date: 2020    SCRIBE #1 NOTE: I, Tammy Estrella, am scribing for, and in the presence of, Dr. Laurent.       History     Chief Complaint   Patient presents with    Headache     "My pressure keeps going up and I have these headaches" x couple of days     Time seen by provider: 3:14 AM    This is a 39 y.o. female with hx of HTN who presents with complaint of intermittent generalized headache for three days. Headache has improved from 8/10 severity to 5/10 currently. Pt delivered a child 11 months ago and went back to work two weeks ago. 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 She states her blood pressure has been in the 140s/80s at baseline, but has been elevated since going back to work. She reports blurry vision and fatigue. She denies chest pain, shortness of breath, N/V, sensitivity to light or sound, or fevers. She took an Excedrin one hour ago. Her hydrochlorothiazide was reduced from 25 mg to 12.5 mg four months ago. She missed one HCTZ dose in the last few days, but took it today.    The history is provided by the patient.     Review of patient's allergies indicates:  No Known Allergies  Past Medical History:   Diagnosis Date    Anemia     Hypertension      Past Surgical History:   Procedure Laterality Date     SECTION      x 2     Family History   Problem Relation Age of Onset    Hypertension Mother     Diabetes Neg Hx     Cancer Neg Hx     Stroke Neg Hx     Heart disease Neg Hx      Social History     Tobacco Use    Smoking status: Never Smoker    Smokeless tobacco: Never Used   Substance Use Topics    Alcohol use: No    Drug use: No     Review of Systems   Constitutional: Positive for fatigue. Negative for fever.   HENT: Negative for sore throat.         Negative for sensitivity to sound.   Eyes: Positive for visual disturbance. Negative for photophobia.   Respiratory: Negative for shortness of " breath.    Cardiovascular: Negative for chest pain.   Gastrointestinal: Negative for nausea and vomiting.   Genitourinary: Negative for dysuria.   Skin: Negative for rash.   Neurological: Positive for headaches. Negative for weakness.   Psychiatric/Behavioral: Negative for confusion.       Physical Exam     Initial Vitals [01/09/20 0249]   BP Pulse Resp Temp SpO2   (!) 191/103 73 16 99 °F (37.2 °C) 100 %      MAP       --         Physical Exam    Nursing note and vitals reviewed.  Constitutional: She appears well-developed and well-nourished. She is not diaphoretic. No distress.   HENT:   Head: Normocephalic and atraumatic.   Eyes: Conjunctivae and EOM are normal. Pupils are equal, round, and reactive to light. No scleral icterus.   Neck: Normal range of motion. Neck supple.   Cardiovascular: Normal rate, regular rhythm and normal heart sounds. Exam reveals no gallop and no friction rub.    No murmur heard.  Pulmonary/Chest: Breath sounds normal. No respiratory distress. She has no wheezes. She has no rhonchi. She has no rales.   Musculoskeletal: Normal range of motion. She exhibits no edema or tenderness.   Neurological: She is alert and oriented to person, place, and time. No cranial nerve deficit.   Skin: Skin is warm and dry.   Psychiatric: She has a normal mood and affect. Her behavior is normal. Judgment and thought content normal.         ED Course   Procedures  Labs Reviewed   POCT URINE PREGNANCY          Imaging Results    None          Medical Decision Making:   History:   Old Medical Records: I decided to obtain old medical records.  Clinical Tests:   Lab Tests: Ordered and Reviewed    Additional MDM:   Comments: 39-year-old female with history of hypertension currently on hydrochlorothiazide 12.5 mg only presents complaining of a frontal headache and concern for her blood pressure being elevated and because of her pain. She denies any other associated symptoms. Blood pressure was markedly elevated in  triage but it did improve once placed in the room without any further interventions.  Physical exam unremarkable. I suspect that her blood pressure elevation is in part due to the stress of returning to work 2 weeks ago after being off for several months.  She also reports working nights over the past 2 weeks and getting very little sleep on a daily basis.  While in the emergency department she received naproxen and on reassessment reported her headache had resolved.  Patient was urged to keep a log of her blood pressure over the next 3-5 days to bring with her to her next primary care doctor's appointment.  She was given precautions for seeking immediate re-evaluation, counseled supportive care for home, and instructed to follow up with her primary care doctor for further management of her hypertension/medication adjustments..          Scribe Attestation:   Scribe #1: I performed the above scribed service and the documentation accurately describes the services I performed. I attest to the accuracy of the note.    Attending Attestation:           Physician Attestation for Scribe:  Physician Attestation Statement for Scribe #1: I, Dr. Laurent, reviewed documentation, as scribed by Tammy Estrella in my presence, and it is both accurate and complete.                               Clinical Impression:     1. Frontal headache    2. Essential hypertension    3. Elevated blood pressure reading                                Ginna Laurent MD  01/09/20 0512

## 2020-12-15 ENCOUNTER — CLINICAL SUPPORT (OUTPATIENT)
Dept: URGENT CARE | Facility: CLINIC | Age: 40
End: 2020-12-15
Payer: MEDICAID

## 2020-12-15 DIAGNOSIS — Z20.822 ENCOUNTER FOR LABORATORY TESTING FOR COVID-19 VIRUS: Primary | ICD-10-CM

## 2020-12-15 LAB
CTP QC/QA: YES
SARS-COV-2 RDRP RESP QL NAA+PROBE: NEGATIVE

## 2020-12-15 PROCEDURE — U0002 COVID-19 LAB TEST NON-CDC: HCPCS | Mod: QW,S$GLB,, | Performed by: FAMILY MEDICINE

## 2020-12-15 PROCEDURE — U0002: ICD-10-PCS | Mod: QW,S$GLB,, | Performed by: FAMILY MEDICINE

## 2021-04-26 ENCOUNTER — PATIENT MESSAGE (OUTPATIENT)
Dept: RESEARCH | Facility: HOSPITAL | Age: 41
End: 2021-04-26

## 2021-08-02 ENCOUNTER — HOSPITAL ENCOUNTER (EMERGENCY)
Facility: OTHER | Age: 41
Discharge: HOME | End: 2021-08-02
Attending: EMERGENCY MEDICINE
Payer: MEDICAID

## 2021-08-02 VITALS
TEMPERATURE: 97 F | HEART RATE: 86 BPM | DIASTOLIC BLOOD PRESSURE: 78 MMHG | RESPIRATION RATE: 17 BRPM | OXYGEN SATURATION: 99 % | SYSTOLIC BLOOD PRESSURE: 135 MMHG

## 2021-08-02 DIAGNOSIS — J01.90 ACUTE NON-RECURRENT SINUSITIS, UNSPECIFIED LOCATION: Primary | ICD-10-CM

## 2021-08-02 DIAGNOSIS — Z01.84 COVID-19 VIRUS ANTIBODY NEGATIVE: ICD-10-CM

## 2021-08-02 LAB
CTP QC/QA: YES
SARS-COV-2 RDRP RESP QL NAA+PROBE: NEGATIVE

## 2021-08-02 PROCEDURE — U0002 COVID-19 LAB TEST NON-CDC: HCPCS | Performed by: PHYSICIAN ASSISTANT

## 2021-08-02 PROCEDURE — 99284 EMERGENCY DEPT VISIT MOD MDM: CPT | Mod: 25

## 2021-08-02 RX ORDER — METHYLPREDNISOLONE 4 MG/1
TABLET ORAL
Qty: 1 PACKAGE | Refills: 0 | Status: SHIPPED | OUTPATIENT
Start: 2021-08-02 | End: 2021-08-23

## 2021-08-02 RX ORDER — GUAIFENESIN AND DEXTROMETHORPHAN HYDROBROMIDE 1200; 60 MG/1; MG/1
1 TABLET, EXTENDED RELEASE ORAL 2 TIMES DAILY PRN
Qty: 30 TABLET | Refills: 0 | Status: SHIPPED | OUTPATIENT
Start: 2021-08-02 | End: 2021-08-12

## 2021-08-02 RX ORDER — CETIRIZINE HYDROCHLORIDE 10 MG/1
10 TABLET ORAL DAILY
Qty: 30 TABLET | Refills: 0 | Status: SHIPPED | OUTPATIENT
Start: 2021-08-02 | End: 2022-08-02

## 2023-03-21 ENCOUNTER — HOSPITAL ENCOUNTER (EMERGENCY)
Facility: OTHER | Age: 43
Discharge: LEFT BEFORE DISCHARGE | End: 2023-03-21
Payer: MEDICAID

## 2023-03-21 VITALS
DIASTOLIC BLOOD PRESSURE: 98 MMHG | SYSTOLIC BLOOD PRESSURE: 175 MMHG | HEART RATE: 68 BPM | RESPIRATION RATE: 16 BRPM | OXYGEN SATURATION: 100 % | TEMPERATURE: 99 F

## 2023-03-21 DIAGNOSIS — R07.9 CHEST PAIN: ICD-10-CM

## 2023-03-21 LAB
ALBUMIN SERPL BCP-MCNC: 4 G/DL (ref 3.5–5.2)
ALP SERPL-CCNC: 50 U/L (ref 55–135)
ALT SERPL W/O P-5'-P-CCNC: 13 U/L (ref 10–44)
ANION GAP SERPL CALC-SCNC: 11 MMOL/L (ref 8–16)
AST SERPL-CCNC: 21 U/L (ref 10–40)
BASOPHILS # BLD AUTO: 0.04 K/UL (ref 0–0.2)
BASOPHILS NFR BLD: 0.8 % (ref 0–1.9)
BILIRUB SERPL-MCNC: 0.3 MG/DL (ref 0.1–1)
BNP SERPL-MCNC: 93 PG/ML (ref 0–99)
BUN SERPL-MCNC: 9 MG/DL (ref 6–20)
CALCIUM SERPL-MCNC: 9.8 MG/DL (ref 8.7–10.5)
CHLORIDE SERPL-SCNC: 105 MMOL/L (ref 95–110)
CO2 SERPL-SCNC: 23 MMOL/L (ref 23–29)
CREAT SERPL-MCNC: 0.7 MG/DL (ref 0.5–1.4)
DIFFERENTIAL METHOD: ABNORMAL
EOSINOPHIL # BLD AUTO: 0.1 K/UL (ref 0–0.5)
EOSINOPHIL NFR BLD: 1.6 % (ref 0–8)
ERYTHROCYTE [DISTWIDTH] IN BLOOD BY AUTOMATED COUNT: 21.8 % (ref 11.5–14.5)
EST. GFR  (NO RACE VARIABLE): >60 ML/MIN/1.73 M^2
GLUCOSE SERPL-MCNC: 88 MG/DL (ref 70–110)
HCT VFR BLD AUTO: 29.9 % (ref 37–48.5)
HCV AB SERPL QL IA: NEGATIVE
HGB BLD-MCNC: 7.7 G/DL (ref 12–16)
HIV 1+2 AB+HIV1 P24 AG SERPL QL IA: NEGATIVE
IMM GRANULOCYTES # BLD AUTO: 0.01 K/UL (ref 0–0.04)
IMM GRANULOCYTES NFR BLD AUTO: 0.2 % (ref 0–0.5)
LYMPHOCYTES # BLD AUTO: 0.8 K/UL (ref 1–4.8)
LYMPHOCYTES NFR BLD: 15.4 % (ref 18–48)
MCH RBC QN AUTO: 17.2 PG (ref 27–31)
MCHC RBC AUTO-ENTMCNC: 25.8 G/DL (ref 32–36)
MCV RBC AUTO: 67 FL (ref 82–98)
MONOCYTES # BLD AUTO: 0.3 K/UL (ref 0.3–1)
MONOCYTES NFR BLD: 6.5 % (ref 4–15)
NEUTROPHILS # BLD AUTO: 3.7 K/UL (ref 1.8–7.7)
NEUTROPHILS NFR BLD: 75.5 % (ref 38–73)
NRBC BLD-RTO: 0 /100 WBC
PLATELET # BLD AUTO: 306 K/UL (ref 150–450)
PMV BLD AUTO: ABNORMAL FL (ref 9.2–12.9)
POTASSIUM SERPL-SCNC: 3.2 MMOL/L (ref 3.5–5.1)
PROT SERPL-MCNC: 7.6 G/DL (ref 6–8.4)
RBC # BLD AUTO: 4.48 M/UL (ref 4–5.4)
SODIUM SERPL-SCNC: 139 MMOL/L (ref 136–145)
TROPONIN I SERPL DL<=0.01 NG/ML-MCNC: <0.006 NG/ML (ref 0–0.03)
WBC # BLD AUTO: 4.92 K/UL (ref 3.9–12.7)

## 2023-03-21 PROCEDURE — 93010 EKG 12-LEAD: ICD-10-PCS | Mod: ,,, | Performed by: INTERNAL MEDICINE

## 2023-03-21 PROCEDURE — 80053 COMPREHEN METABOLIC PANEL: CPT | Performed by: NURSE PRACTITIONER

## 2023-03-21 PROCEDURE — 93010 ELECTROCARDIOGRAM REPORT: CPT | Mod: ,,, | Performed by: INTERNAL MEDICINE

## 2023-03-21 PROCEDURE — 87389 HIV-1 AG W/HIV-1&-2 AB AG IA: CPT | Performed by: EMERGENCY MEDICINE

## 2023-03-21 PROCEDURE — 83880 ASSAY OF NATRIURETIC PEPTIDE: CPT | Performed by: NURSE PRACTITIONER

## 2023-03-21 PROCEDURE — 85025 COMPLETE CBC W/AUTO DIFF WBC: CPT | Performed by: NURSE PRACTITIONER

## 2023-03-21 PROCEDURE — 99285 EMERGENCY DEPT VISIT HI MDM: CPT | Mod: 25

## 2023-03-21 PROCEDURE — 93005 ELECTROCARDIOGRAM TRACING: CPT

## 2023-03-21 PROCEDURE — 84484 ASSAY OF TROPONIN QUANT: CPT | Performed by: NURSE PRACTITIONER

## 2023-03-21 PROCEDURE — 86803 HEPATITIS C AB TEST: CPT | Performed by: EMERGENCY MEDICINE

## 2023-03-21 RX ORDER — ASPIRIN 325 MG
325 TABLET ORAL
Status: DISCONTINUED | OUTPATIENT
Start: 2023-03-21 | End: 2023-03-21 | Stop reason: HOSPADM

## 2023-03-21 NOTE — FIRST PROVIDER EVALUATION
" Emergency Department TeleTriage Encounter Note      CHIEF COMPLAINT    Chief Complaint   Patient presents with    Anxiety     Multiple anxiety attacks x3 weeks per EMS, non-compliant with BP meds. Also requesting Lexapro refill.        VITAL SIGNS   Initial Vitals [03/21/23 1754]   BP Pulse Resp Temp SpO2   (!) 175/98 68 16 98.5 °F (36.9 °C) 100 %      MAP       --            ALLERGIES    Review of patient's allergies indicates:  No Known Allergies    PROVIDER TRIAGE NOTE  This is a teletriage evaluation of a 42 y.o. female presenting to the ED complaining of CP constantly since last night. Reports frequent "anxiety attacks" over the past three weeks due to increased stress. No SOB. Denies SI and HI.     Well-appearing, no distress.     Initial orders will be placed and care will be transferred to an alternate provider when patient is roomed for a full evaluation. Any additional orders and the final disposition will be determined by that provider.         ORDERS  Labs Reviewed   HIV 1 / 2 ANTIBODY   HEPATITIS C ANTIBODY   CBC W/ AUTO DIFFERENTIAL   COMPREHENSIVE METABOLIC PANEL   TROPONIN I   B-TYPE NATRIURETIC PEPTIDE   POCT URINE PREGNANCY       ED Orders (720h ago, onward)      Start Ordered     Status Ordering Provider    03/21/23 1830 03/21/23 1828  aspirin tablet 325 mg  ED 1 Time         Ordered JUAN FISHER N.    03/21/23 1829 03/21/23 1828  Vital signs  Every 15 min         Ordered JUAN FISHER N.    03/21/23 1829 03/21/23 1828  Cardiac Monitoring - Adult  Continuous        Comments: Notify Physician If:    Ordered JUAN FISHER N.    03/21/23 1829 03/21/23 1828  Pulse Oximetry Continuous  Continuous         Ordered JUAN FISHER N.    03/21/23 1829 03/21/23 1828  Diet NPO  Diet effective now         Ordered JUAN FISHER N.    03/21/23 1829 03/21/23 1828  Saline lock IV  Once         Ordered JUAN FISHER N.    03/21/23 1829 03/21/23 1828  EKG " 12-lead  Once        Comments: Do not perform if previously done during this visit/ triage    Ordered JUAN FISHER N.    03/21/23 1829 03/21/23 1828  CBC auto differential  STAT         Ordered JUAN FISHER N.    03/21/23 1829 03/21/23 1828  Comprehensive metabolic panel  STAT         Ordered PHILLIP JUAN N.    03/21/23 1829 03/21/23 1828  Troponin I #1  STAT         Ordered PHILLIP JUAN N.    03/21/23 1829 03/21/23 1828  B-Type natriuretic peptide (BNP)  STAT         Ordered PHILLIP JUAN N.    03/21/23 1829 03/21/23 1828  X-Ray Chest AP Portable  1 time imaging         Ordered PHILLIP JUAN N.    03/21/23 1829 03/21/23 1828  POCT urine pregnancy  Once         Ordered PHILLIP JUAN N.    03/21/23 1744 03/21/23 1743  HIV 1/2 Ag/Ab (4th Gen)  STAT         Ordered MARKOS DEJESUS    03/21/23 1744 03/21/23 1743  Hepatitis C Antibody  STAT         Ordered MARKOS DEJESUS              Virtual Visit Note: The provider triage portion of this emergency department evaluation and documentation was performed via [employer], a HIPAA-compliant telemedicine application, in concert with a tele-presenter in the room. A face to face patient evaluation with one of my colleagues will occur once the patient is placed in an emergency department room.      DISCLAIMER: This note was prepared with [employer] voice recognition transcription software. Garbled syntax, mangled pronouns, and other bizarre constructions may be attributed to that software system.

## 2023-03-22 NOTE — ED NOTES
Per triage, patient stated she was leaving and walked out of ED with ease and in no acute signs of distress.   rolo all pertinent systems normal

## 2023-05-25 ENCOUNTER — HOSPITAL ENCOUNTER (EMERGENCY)
Facility: OTHER | Age: 43
Discharge: HOME | End: 2023-05-25
Attending: EMERGENCY MEDICINE
Payer: MEDICAID

## 2023-05-25 VITALS
OXYGEN SATURATION: 100 % | TEMPERATURE: 98 F | RESPIRATION RATE: 20 BRPM | SYSTOLIC BLOOD PRESSURE: 160 MMHG | HEART RATE: 69 BPM | DIASTOLIC BLOOD PRESSURE: 87 MMHG

## 2023-05-25 DIAGNOSIS — R00.2 PALPITATIONS: Primary | ICD-10-CM

## 2023-05-25 DIAGNOSIS — F41.9 ANXIETY: ICD-10-CM

## 2023-05-25 PROBLEM — O09.522 ELDERLY MULTIGRAVIDA IN SECOND TRIMESTER: Status: RESOLVED | Noted: 2018-10-14 | Resolved: 2023-05-25

## 2023-05-25 PROBLEM — Z34.81 NORMAL PREGNANCY IN MULTIGRAVIDA IN FIRST TRIMESTER: Status: RESOLVED | Noted: 2018-09-18 | Resolved: 2023-05-25

## 2023-05-25 LAB
B-HCG UR QL: NEGATIVE
CTP QC/QA: YES

## 2023-05-25 PROCEDURE — 93010 EKG 12-LEAD: ICD-10-PCS | Mod: ,,, | Performed by: INTERNAL MEDICINE

## 2023-05-25 PROCEDURE — 81025 URINE PREGNANCY TEST: CPT | Performed by: EMERGENCY MEDICINE

## 2023-05-25 PROCEDURE — 99283 EMERGENCY DEPT VISIT LOW MDM: CPT

## 2023-05-25 PROCEDURE — 93010 ELECTROCARDIOGRAM REPORT: CPT | Mod: ,,, | Performed by: INTERNAL MEDICINE

## 2023-05-25 PROCEDURE — 93005 ELECTROCARDIOGRAM TRACING: CPT

## 2023-05-25 RX ORDER — HYDROXYZINE HYDROCHLORIDE 25 MG/1
25 TABLET, FILM COATED ORAL 4 TIMES DAILY PRN
Qty: 60 TABLET | Refills: 0 | Status: SHIPPED | OUTPATIENT
Start: 2023-05-25

## 2023-05-25 NOTE — DISCHARGE INSTRUCTIONS
Thank you for letting us take care of you today! It was nice meeting you, and I hope you feel better soon.     Call your primary care doctor to make the first available appointment.     Keep all your medical appointments.     Take your regular medication as prescribed. Contact your primary care provider before running out of medication, or for any problems obtaining them.    Do not drive or operate heavy machinery while taking opioid or muscle relaxing medications. Examples include norco, percocet, xanax, valium, flexeril.     Overuse or long term use of pain and sedating medication may lead to addiction, dependence, organ failure, family and work problems, legal problems, accidental overdose, and death.    If you do not have health insurance, you probably can afford it:  Call [phone] (Formerly [facility] hotline) or go to www.Perfect.la.gov    Your evaluation in the ER does not suggest any emergent or life threatening medical condition requiring admission or immediate intervention beyond that provided in the ER.   However, the signs of a serious problem sometimes take more time to appear.     Do not hesitate to return to the ER if any of the following occur:    Weakness, dizziness, fainting, or loss of consciousness   Fever of 100.4ºF (38ºC) or higher  Any worse symptoms  Any new or concerning symptoms    To protect yourself and others from COVID19:  Get vaccinated.   Anyone over 6 months old is eligible for vaccination.   If your last dose was over 6 months ago, you are probably due for another shot.   Vaccination is shown to prevent getting sick, ending up in the hospital, or dying because of COVID19.     If not vaccinated or over a long time since your last dose:  Your shot is waiting for you. To get it:   Text your ZIP code to GETVAX ([id number]) or VACUNA ([id number]) in [place]  call 311, or [phone], or [phone], or [phone],   go to www.vaccines.gov, or  Call your health provider    If exposed to someone with  cold, flu, or COVID symptoms, consider quarantining or at least wearing a mask around others for at least 5 days.   If exposed to someone with COVID19, wear a mask around others for 10 days, and test yourself at 5 days if you have no symptoms.    Every [place] household is eligible to order 4 free at-home COVID-19 tests from www.covidtests.gov    You need to see a primary care doctor or psychiatrist for your mood problems.    If you do not have insurance contact [facility] at [phone].    You may also contact [facility] ([phone]) or [facility] ([phone]).    For Mental Health or Drug Abuse Resources, Please See Below:    [facility] ([facility]) Crisis Services for problems with mental health (suicidal, overwhelmed, agitated, despressed, withdrawn, etc.), problems with drugs/alcohol, or developmental disabilities. Accepts uninsured and medicaid:   [phone] or [phone]   [facility] websites:   www.sdla.org/services/crisis   www.[facility].org     [facility]: The ONLY initial inpatient option for detox/medically-supported withdrawal in [place] for UNinsured clients.   Lasts 4 to 7 days.   Requirements: must be at least 22 years old & have Medicaid or be uninsured).   [facility] also has residential substance treatment/rehab programs after intial detox is accomplished.   [phone]   www.[facility].org       [facility] (men, at least 18 years old) & [facility] (women, at least 18 years old):   NO initial Detox. They don't do initial detox nor medically assisted withdrawals. Clients can go to their choice of initial detox at [facility], [facility], [place], [place], or wherever [facility] would advise.   Has long-term residential substance abuse treatment programs, where clients live and participate in intense rehabilitation.   [phone]   www.[facility]:   Should have insurance or Medicaid. Otherwise, for  UNinsured, it costs $4000 for the 5-day detox program, payable on arrival.   [phone] or [phone]   www.[facility]     OUT-Patient resources:   [facility] (No age limits)   Offers intensive outpatient treatment, medically-assisted outpatient detox with suboxone, counseling, AA/12 Steps, etc  Accepts uninsured residents of [place], [place], or [facility]. Residents of other [facility] must contact the Human Services District in their [place] for options.   [facility] locations:   [place] [phone]   [place]: [phone]   [place]: [phone]     [place]:   Accepts insurances, cash, credit card or financing plan for payment  [phone]   In [place]: [phone]   In [place]: [phone]   ---- ----       ADDITIONAL Addiction & Mental Health RESOURCES:   Dial 2 1 1   Dial 211 anytime 24 / 7 for current local resources for addiction, suicide prevention, help on how to cope, obtain services, etc.       [facility] - Crisis Services (For [place] Residents needing care for mental health, addiction or developmental disabilities)   [phone]   www.[facility].org     SAMA - Substance Abuse & Mental Health Services Administration (Educational, not a Crisis resource)   www.samhsa.org   Various meds used for M.A.T. (medically assisted treatment) of opioid addiction:   http://www.samhsa.gov/medication-assisted-treatment         Other Mental Health Clinics (MHCs) include:     [facility] [address] [place] [address]. [phone]     [facility] [address] [phone]     [facility] [address] [phone]     [facility] [address] [phone]     Anyone who is suicidal may receive immediate help by going to the ER, calling 911, calling 976, or going to Suicide.org. Suicide is preventable, and if you are feeling suicidal, you must get help.    Overview  Heart palpitations are the uncomfortable sensation that your heart is beating  fast or irregularly. You might feel pounding or fluttering in your chest. It might feel like your heart is skipping a beat.    Palpitations may be caused by many things. These include stress, exercise, or use of alcohol, caffeine, or nicotine. Some prescription medicines and over-the-counter medicines can also cause heart palpitations, as well as heart problems and other health problems. Nearly everyone has palpitations from time to time.    Depending on your symptoms, your doctor may need to do more tests to try to find the cause of your palpitations.    Follow-up care is a key part of your treatment and safety. Be sure to make and go to all appointments, and call your doctor if you are having problems. It's also a good idea to know your test results and keep a list of the medicines you take.    How can you care for yourself at home?  If they trigger palpitations, limit or avoid alcohol or caffeine.  Do not smoke. If you need help quitting, talk to your doctor about stop-smoking programs and medicines. These can increase your chances of quitting for good.  Ask your doctor whether you can take over-the-counter medicines (such as decongestants). These may cause palpitations.  If you think you may have a problem with drug use, talk to your doctor. Certain drugs, such as cocaine and methamphetamine, can affect your heart rate and rhythm.  If you have palpitations again, take deep breaths and try to relax. Or try any physical things that your doctor recommended. These may include bearing down or coughing.  If you start to feel lightheaded, sit or lie down to avoid injuries that might result if you pass out and fall down.    If your doctor recommends it, keep a record of your palpitations and bring it to your next doctor's appointment. Write down:  The date and time.  Your pulse. (If your heart is beating fast, it may be hard to count your pulse.)  If your heart rhythm was regular or irregular.  What you were doing when  the palpitations started.  How long the palpitations lasted.  Any other symptoms.  What may have helped your symptoms go away.    If an activity causes palpitations, slow down or stop. Talk to your doctor before you do that activity again.  Take your medicines exactly as prescribed. Call your doctor if you think you are having a problem with your medicine.    When should you call for help?  Call 911 anytime you think you may need emergency care. For example, call if:    You passed out (lost consciousness).  You have symptoms of a heart attack. These may include:  Chest pain or pressure, or a strange feeling in the chest.  Sweating.  Shortness of breath.  Pain, pressure, or a strange feeling in the back, neck, jaw, or upper belly or in one or both shoulders or arms.  Lightheadedness or sudden weakness.  A fast or irregular heartbeat.  You have symptoms of a stroke. These may include:  Sudden numbness, tingling, weakness, or loss of movement in your face, arm, or leg, especially on only one side of your body.  Sudden vision changes.  Sudden trouble speaking.  Sudden confusion or trouble understanding simple statements.  Sudden problems with walking or balance.  A sudden, severe headache that is different from past headaches.    Call your doctor now or seek immediate medical care if:    You have heart palpitations and:  Are dizzy or lightheaded, or you feel like you may faint.  Have new or increased shortness of breath.    Watch closely for changes in your health, and be sure to contact your doctor if:    You continue to have heart palpitations.

## 2023-05-25 NOTE — ED TRIAGE NOTES
Pt report having conversation about a  friend that  when she started having palpitations. She stated she went to grab a cigarette which only made her feel worst

## 2023-05-25 NOTE — ED PROVIDER NOTES
"  Source of History:  Medical record, patient.    Chief complaint:  Per triage note: "Anxiety (Pt presents via ems stating she is feeling anxious tonight. )  "    HPI:    Patient presents with complaints of anxiety and palpitations since just prior to arrival. She reports having a conversation with a neighbor about a recently  friend. This made her anxious, so she has had smoke cigarette.  While smoking, she noted sensation that her heart was racing, had paresthesias to her toes.  She states that she started smoking "here and there" recently. She states she was feeling well otherwise all day. Patient describes a long history of episodic severe episodic anxiety.  She denies any history of seeing a mental health counselor or psychiatrist for this problem.   Progression of her symptoms is improved, however she still feels that her heart is racing during out interview.  This is the extent of the patient's complaints at this time.    ROS:   See HPI for pertinent Review of Systems  No fevers.   No chest pain    Review of patient's allergies indicates:  No Known Allergies    PMH:  As per HPI and below:  Past Medical History:   Diagnosis Date    Anemia     Hypertension        Past Surgical History:   Procedure Laterality Date     SECTION      x 2       Social History     Tobacco Use    Smoking status: Never    Smokeless tobacco: Never   Substance Use Topics    Alcohol use: No    Drug use: No       Physical Exam:      Nursing note and vitals reviewed.  BP (!) 152/87   Pulse 80   Temp 98.4 °F (36.9 °C) (Oral)   Resp 18   SpO2 100%     Constitutional: No distress.  Eyes: EOMI. No discharge. Anicteric.  HENT:   Neck: Normal range of motion. Neck supple.  Cardiovascular: Normal rate. No murmur, no gallop and no friction rub heard.   Pulmonary/Chest: No respiratory distress. Effort normal. No wheezes, no rales, no rhonchi.   Abdominal: Bowel sounds normal. Soft. No distension and no mass. There is no " "tenderness. There is no rebound, no guarding, no tenderness at McBurney's point.  Neurological: GCS 15. Alert and oriented to person, place, and time. No gross cranial nerve, light touch or strength deficit. Coordination normal.   Skin: Skin is warm and dry. Intricate tattoo at right hand.   EXT: 2+ radial pulses.   Psychiatric: Behavior is normal. Mildly anxious affect. Fair to good insight. Judgment normal.    Medical Decision Making / Independent Interpretations / External Records Reviewed:      Patient is a 42-year-old female with history of hypertension who presents with anxiety, palpitations in the setting of talking about her recently  friend with a neighbor, then smoking a cigarette.  She denies any associated chest pain, shortness breath, but had paresthesias at her toes on both sides.  Otherwise she denies any focal deficits she describes that she started smoking recently, but is not a regular smoker she describes a long history of episodic severe anxiety with emotional stressors.  She states that she was otherwise in her usual state of health prior to this episode although notes that her "nerves were so bad" that she did not work for about two months, but recently started working again.   On my exam, patient has a normal heart rate, no murmurs, is well-appearing albeit somewhat anxious.   The initial differential included dysrhythmia, dehydration, acute effects of nicotine adjustment disorder, anxiety disorder including generalized anxiety disorder and panic disorder. Clinically, she does not appear to be gravely disabled.       ED Course as of 23 021   Thu May 25, 2023   0116 --  EKG Interpretation: I independently reviewed and interpreted EKG which shows normal sinus rhythm at 71 beats per minute, no STEMI, no significant acute ST/T abnormalities, normal intervals..  No acute change compared to prior tracing.  --   [RC]      ED Course User Index  [RC] Haroon Taylor MD     She has fair " to good insight, is open to following up with mental health resources including psychiatry.     At this time there is no evidence of severe acute condition.  I do not feel further emergent workup is warranted at this time.   --  I discussed with pt and/or guardian/caretaker that this evaluation in the ED does not suggest any emergent or life threatening medical condition requiring admission or further immediate intervention or diagnostics. Regardless, an unremarkable evaluation in the ED does not preclude the development or presence of a serious or life threatening condition. Pt was instructed to return for any worsening, new, changed, or concerning symptoms.     I had a detailed discussion with patient and/or guardian/caretaker regarding findings, plan, return precautions, importance of medication adherence, need to follow-up with a PCP and specialist. All questions answered.     Note was created using voice recognition software. It may have occasional typographical errors not identified and edited despite initial review prior to signing.    I decided to obtain the patient's medical records. I reviewed patient's prior external notes / results: OBGYN.     Medications - No data to display         ---  I, Patel Garcia, scribed for, and in the presence of, Dr. Taylor. I performed the scribed service and the documentation accurately describes the services I performed. I attest to the accuracy of the note.     Physician Attestation for Scribe:   I, Haroon Taylor MD, reviewed documentation as scribed in my presence, which is both accurate and complete.    Diagnostic Impression:    1. Palpitations    2. Anxiety          No future appointments.          Haroon Taylor MD  05/25/23 3668

## 2023-05-26 ENCOUNTER — PATIENT OUTREACH (OUTPATIENT)
Dept: ADMINISTRATIVE | Facility: OTHER | Age: 43
End: 2023-05-26
Payer: MEDICAID

## 2023-06-08 ENCOUNTER — HOSPITAL ENCOUNTER (EMERGENCY)
Facility: OTHER | Age: 43
Discharge: HOME | End: 2023-06-08
Attending: EMERGENCY MEDICINE
Payer: MEDICAID

## 2023-06-08 VITALS
RESPIRATION RATE: 14 BRPM | HEART RATE: 65 BPM | WEIGHT: 163 LBS | TEMPERATURE: 98 F | BODY MASS INDEX: 28.87 KG/M2 | DIASTOLIC BLOOD PRESSURE: 89 MMHG | SYSTOLIC BLOOD PRESSURE: 160 MMHG | OXYGEN SATURATION: 100 %

## 2023-06-08 DIAGNOSIS — E87.6 HYPOKALEMIA: ICD-10-CM

## 2023-06-08 DIAGNOSIS — R07.89 ATYPICAL CHEST PAIN: Primary | ICD-10-CM

## 2023-06-08 DIAGNOSIS — D64.9 CHRONIC ANEMIA: ICD-10-CM

## 2023-06-08 DIAGNOSIS — R00.2 PALPITATIONS: ICD-10-CM

## 2023-06-08 LAB
ALBUMIN SERPL BCP-MCNC: 3.8 G/DL (ref 3.5–5.2)
ALP SERPL-CCNC: 45 U/L (ref 55–135)
ALT SERPL W/O P-5'-P-CCNC: 13 U/L (ref 10–44)
ANION GAP SERPL CALC-SCNC: 13 MMOL/L (ref 8–16)
ANION GAP SERPL CALC-SCNC: 9 MMOL/L (ref 8–16)
AST SERPL-CCNC: 20 U/L (ref 10–40)
BASOPHILS # BLD AUTO: 0.04 K/UL (ref 0–0.2)
BASOPHILS NFR BLD: 0.9 % (ref 0–1.9)
BILIRUB SERPL-MCNC: 0.3 MG/DL (ref 0.1–1)
BUN SERPL-MCNC: 8 MG/DL (ref 6–20)
BUN SERPL-MCNC: 9 MG/DL (ref 6–20)
CALCIUM SERPL-MCNC: 9.2 MG/DL (ref 8.7–10.5)
CALCIUM SERPL-MCNC: 9.4 MG/DL (ref 8.7–10.5)
CHLORIDE SERPL-SCNC: 102 MMOL/L (ref 95–110)
CHLORIDE SERPL-SCNC: 104 MMOL/L (ref 95–110)
CO2 SERPL-SCNC: 22 MMOL/L (ref 23–29)
CO2 SERPL-SCNC: 25 MMOL/L (ref 23–29)
CREAT SERPL-MCNC: 0.7 MG/DL (ref 0.5–1.4)
CREAT SERPL-MCNC: 0.8 MG/DL (ref 0.5–1.4)
DIFFERENTIAL METHOD: ABNORMAL
EOSINOPHIL # BLD AUTO: 0.1 K/UL (ref 0–0.5)
EOSINOPHIL NFR BLD: 2.1 % (ref 0–8)
ERYTHROCYTE [DISTWIDTH] IN BLOOD BY AUTOMATED COUNT: 21.7 % (ref 11.5–14.5)
EST. GFR  (NO RACE VARIABLE): >60 ML/MIN/1.73 M^2
EST. GFR  (NO RACE VARIABLE): >60 ML/MIN/1.73 M^2
GLUCOSE SERPL-MCNC: 91 MG/DL (ref 70–110)
GLUCOSE SERPL-MCNC: 95 MG/DL (ref 70–110)
HCT VFR BLD AUTO: 29.7 % (ref 37–48.5)
HGB BLD-MCNC: 7.9 G/DL (ref 12–16)
IMM GRANULOCYTES # BLD AUTO: 0.01 K/UL (ref 0–0.04)
IMM GRANULOCYTES NFR BLD AUTO: 0.2 % (ref 0–0.5)
LYMPHOCYTES # BLD AUTO: 0.6 K/UL (ref 1–4.8)
LYMPHOCYTES NFR BLD: 13.1 % (ref 18–48)
MCH RBC QN AUTO: 18.2 PG (ref 27–31)
MCHC RBC AUTO-ENTMCNC: 26.6 G/DL (ref 32–36)
MCV RBC AUTO: 68 FL (ref 82–98)
MONOCYTES # BLD AUTO: 0.3 K/UL (ref 0.3–1)
MONOCYTES NFR BLD: 6.8 % (ref 4–15)
NEUTROPHILS # BLD AUTO: 3.3 K/UL (ref 1.8–7.7)
NEUTROPHILS NFR BLD: 76.9 % (ref 38–73)
NRBC BLD-RTO: 0 /100 WBC
PLATELET # BLD AUTO: 216 K/UL (ref 150–450)
PMV BLD AUTO: ABNORMAL FL (ref 9.2–12.9)
POTASSIUM SERPL-SCNC: 2.8 MMOL/L (ref 3.5–5.1)
POTASSIUM SERPL-SCNC: 3.3 MMOL/L (ref 3.5–5.1)
PROT SERPL-MCNC: 7.1 G/DL (ref 6–8.4)
RBC # BLD AUTO: 4.35 M/UL (ref 4–5.4)
SODIUM SERPL-SCNC: 137 MMOL/L (ref 136–145)
SODIUM SERPL-SCNC: 138 MMOL/L (ref 136–145)
TROPONIN I SERPL DL<=0.01 NG/ML-MCNC: <0.006 NG/ML (ref 0–0.03)
TROPONIN I SERPL DL<=0.01 NG/ML-MCNC: <0.006 NG/ML (ref 0–0.03)
WBC # BLD AUTO: 4.26 K/UL (ref 3.9–12.7)

## 2023-06-08 PROCEDURE — 93010 EKG 12-LEAD: ICD-10-PCS | Mod: ,,, | Performed by: INTERNAL MEDICINE

## 2023-06-08 PROCEDURE — 96365 THER/PROPH/DIAG IV INF INIT: CPT

## 2023-06-08 PROCEDURE — 99284 EMERGENCY DEPT VISIT MOD MDM: CPT | Mod: 25

## 2023-06-08 PROCEDURE — 85025 COMPLETE CBC W/AUTO DIFF WBC: CPT | Performed by: EMERGENCY MEDICINE

## 2023-06-08 PROCEDURE — 80048 BASIC METABOLIC PNL TOTAL CA: CPT | Mod: XB | Performed by: EMERGENCY MEDICINE

## 2023-06-08 PROCEDURE — 93005 ELECTROCARDIOGRAM TRACING: CPT

## 2023-06-08 PROCEDURE — 84484 ASSAY OF TROPONIN QUANT: CPT | Mod: 91 | Performed by: EMERGENCY MEDICINE

## 2023-06-08 PROCEDURE — 25000003 PHARM REV CODE 250: Performed by: EMERGENCY MEDICINE

## 2023-06-08 PROCEDURE — 63600175 PHARM REV CODE 636 W HCPCS: Performed by: EMERGENCY MEDICINE

## 2023-06-08 PROCEDURE — 93010 ELECTROCARDIOGRAM REPORT: CPT | Mod: ,,, | Performed by: INTERNAL MEDICINE

## 2023-06-08 PROCEDURE — 96361 HYDRATE IV INFUSION ADD-ON: CPT

## 2023-06-08 PROCEDURE — 80053 COMPREHEN METABOLIC PANEL: CPT | Performed by: EMERGENCY MEDICINE

## 2023-06-08 RX ORDER — SODIUM CHLORIDE 9 MG/ML
1000 INJECTION, SOLUTION INTRAVENOUS
Status: COMPLETED | OUTPATIENT
Start: 2023-06-08 | End: 2023-06-08

## 2023-06-08 RX ORDER — MAG HYDROX/ALUMINUM HYD/SIMETH 200-200-20
30 SUSPENSION, ORAL (FINAL DOSE FORM) ORAL
Status: COMPLETED | OUTPATIENT
Start: 2023-06-08 | End: 2023-06-08

## 2023-06-08 RX ORDER — ACETAMINOPHEN 500 MG
1000 TABLET ORAL
Status: COMPLETED | OUTPATIENT
Start: 2023-06-08 | End: 2023-06-08

## 2023-06-08 RX ORDER — HYDROCHLOROTHIAZIDE 25 MG/1
25 TABLET ORAL
COMMUNITY
Start: 2023-05-31

## 2023-06-08 RX ORDER — POTASSIUM CHLORIDE 7.45 MG/ML
10 INJECTION INTRAVENOUS
Status: COMPLETED | OUTPATIENT
Start: 2023-06-08 | End: 2023-06-08

## 2023-06-08 RX ORDER — POTASSIUM CHLORIDE 20 MEQ/1
40 TABLET, EXTENDED RELEASE ORAL
Status: COMPLETED | OUTPATIENT
Start: 2023-06-08 | End: 2023-06-08

## 2023-06-08 RX ADMIN — SODIUM CHLORIDE 1000 ML: 9 INJECTION, SOLUTION INTRAVENOUS at 07:06

## 2023-06-08 RX ADMIN — ALUMINUM HYDROXIDE, MAGNESIUM HYDROXIDE, AND SIMETHICONE 30 ML: 200; 200; 20 SUSPENSION ORAL at 11:06

## 2023-06-08 RX ADMIN — POTASSIUM CHLORIDE 40 MEQ: 1500 TABLET, EXTENDED RELEASE ORAL at 08:06

## 2023-06-08 RX ADMIN — POTASSIUM CHLORIDE 10 MEQ: 7.46 INJECTION, SOLUTION INTRAVENOUS at 09:06

## 2023-06-08 RX ADMIN — ACETAMINOPHEN 1000 MG: 500 TABLET, FILM COATED ORAL at 10:06

## 2023-06-08 NOTE — ED PROVIDER NOTES
Encounter Date: 2023    SCRIBE #1 NOTE: I, Teresa Matson, am scribing for, and in the presence of,  Reinaldo Wright II, MD.     History     Chief Complaint   Patient presents with    Chest Pain     L side chest pain radiating to arm worsening with deep breath.      Amina Vazquez is a 42 y.o. female, with a PMHx of HTN and anemia, who presents to the ED with complaint of heart palpitations for the past 2 hours. She was woken up by a feeling of pounding and racing heart, then had an episode of sweats and right-sided numbness which has since resolved. She is currently on her menstrual cycle, and she does report a history of similar symptoms in  when she had a particularly heavy menstrual cycle. She states that last week her hemoglobin level was 8. She took her BP medication 1 hour ago.     The history is provided by the patient.   Review of patient's allergies indicates:  No Known Allergies  Past Medical History:   Diagnosis Date    Anemia     Hypertension      Past Surgical History:   Procedure Laterality Date     SECTION      x 2     Family History   Problem Relation Age of Onset    Hypertension Mother     Diabetes Neg Hx     Cancer Neg Hx     Stroke Neg Hx     Heart disease Neg Hx      Social History     Tobacco Use    Smoking status: Never    Smokeless tobacco: Never   Substance Use Topics    Alcohol use: No    Drug use: No     Review of Systems  See HPI.     Physical Exam     Initial Vitals [23 0714]   BP Pulse Resp Temp SpO2   (!) 198/91 76 17 97.9 °F (36.6 °C) 100 %      MAP       --         Physical Exam    Nursing note and vitals reviewed.  Constitutional: She appears well-developed and well-nourished. She is not diaphoretic. No distress.   Anxious appearing.    HENT:   Head: Normocephalic and atraumatic.   Moist mucous membranes.   Eyes: EOM are normal. Pupils are equal, round, and reactive to light.   No pallor or icterus.   Neck: Neck supple.   Normal range of  motion.  Cardiovascular:  Normal rate, regular rhythm and normal heart sounds.     Exam reveals no gallop and no friction rub.       No murmur heard.  Pulmonary/Chest: Breath sounds normal. No respiratory distress. She has no wheezes. She has no rhonchi. She has no rales.   Abdominal: Abdomen is soft. There is no abdominal tenderness.   Musculoskeletal:         General: No tenderness or edema. Normal range of motion.      Cervical back: Normal range of motion and neck supple.     Lymphadenopathy:     She has no cervical adenopathy.   Neurological: She is alert and oriented to person, place, and time. She has normal strength. No cranial nerve deficit or sensory deficit.   Skin: Skin is warm and dry.   Psychiatric: She has a normal mood and affect. Her behavior is normal. Judgment and thought content normal.       ED Course   Procedures  Labs Reviewed   CBC W/ AUTO DIFFERENTIAL - Abnormal; Notable for the following components:       Result Value    Hemoglobin 7.9 (*)     Hematocrit 29.7 (*)     MCV 68 (*)     MCH 18.2 (*)     MCHC 26.6 (*)     RDW 21.7 (*)     Lymph # 0.6 (*)     Gran % 76.9 (*)     Lymph % 13.1 (*)     All other components within normal limits   COMPREHENSIVE METABOLIC PANEL - Abnormal; Notable for the following components:    Potassium 2.8 (*)     CO2 22 (*)     Alkaline Phosphatase 45 (*)     All other components within normal limits   BASIC METABOLIC PANEL - Abnormal; Notable for the following components:    Potassium 3.3 (*)     All other components within normal limits   TROPONIN I   TROPONIN I     EKG Readings: (Independently Interpreted)   Sinus rhythm. Rate of 70. No ST or T wave changes. No ischemia or arrhythmia.    ECG Results              EKG 12-lead (Final result)  Result time 06/08/23 14:35:41      Final result by Interface, Lab In [facility] (06/08/23 14:35:41)                   Narrative:    Test Reason : R00.2,    Vent. Rate : 065 BPM     Atrial Rate : 065 BPM     P-R Int : 152 ms           QRS Dur : 098 ms      QT Int : 448 ms       P-R-T Axes : 052 046 016 degrees     QTc Int : 465 ms    Normal sinus rhythm  Normal ECG    Confirmed by Osei Hickey MD ([id number]) on 6/8/2023 2:35:29 PM    Referred By: FORTINO ALTAMIRANO           Confirmed By:Osei Hickey MD                                     EKG 12-lead (Final result)  Result time 06/08/23 14:35:41      Final result by Interface, Lab In [facility] (06/08/23 14:35:41)                   Narrative:    Test Reason :     Vent. Rate : 068 BPM     Atrial Rate : 068 BPM     P-R Int : 126 ms          QRS Dur : 106 ms      QT Int : 428 ms       P-R-T Axes : 033 054 033 degrees     QTc Int : 455 ms    Normal sinus rhythm  Normal ECG    Confirmed by Osei Hickey MD ([id number]) on 6/8/2023 2:35:28 PM    Referred By: FORTINO ALTAMIRANO           Confirmed By:Osei Hickey MD                                  Imaging Results    None          Medications   0.9%  NaCl infusion (0 mLs Intravenous Stopped 6/8/23 1205)   potassium chloride 10 mEq in 100 mL IVPB (0 mEq Intravenous Stopped 6/8/23 1005)   potassium chloride SA CR tablet 40 mEq (40 mEq Oral Given 6/8/23 0853)   acetaminophen tablet 1,000 mg (1,000 mg Oral Given 6/8/23 1048)   aluminum-magnesium hydroxide-simethicone 200-200-20 mg/5 mL suspension 30 mL (30 mLs Oral Given 6/8/23 1150)     Medical Decision Making:   History:   Old Medical Records: I decided to obtain old medical records.  Independently Interpreted Test(s):   I have ordered and independently interpreted EKG Reading(s) - see prior notes  Clinical Tests:   Lab Tests: Ordered and Reviewed  Medical Tests: Ordered and Reviewed     Patient presents complaining of palpitations and vague chest pressure.  Similar symptoms to when she is been anemic in the past and just finished her menses, was worried about worsening anemia.  She is compliant with iron.  On exam vital signs are stable.  No arrhythmia while on the monitor.  EKG unremarkable.  Initial  troponin negative.  Laboratory studies show a stable hemoglobin of 8.  Given the patient does not have any shortness of breath, orthostasis or other worrisome signs of symptomatic anemia I do not feel she needs a blood transfusion and patient is in agreement.  Because of the timing of the chest pain we did obtain a repeat troponin which was again negative.  Patient is stable for discharge.  and antiemetics.  Encouraged continued hydration at home.  Encouraged follow-up with primary care.  Return precautions discussed for the interim       Scribe Attestation:   Scribe #1: I performed the above scribed service and the documentation accurately describes the services I performed. I attest to the accuracy of the note.  Physician Attestation for Scribe: I, TL, reviewed documentation as scribed in my presence, which is both accurate and complete.                 Clinical Impression:   Final diagnoses:  [R00.2] Palpitations  [R07.89] Atypical chest pain (Primary)  [E87.6] Hypokalemia  [D64.9] Chronic anemia        ED Disposition Condition    Discharge Stable          ED Prescriptions    None       Follow-up Information       Follow up With Specialties Details Why Contact Info    Lisa Bourne MD General Practice In 1 week  [address]  NOT PRACTICING  [facility] [address]  [phone]               Reinaldo Wright II, MD  06/09/23 0616

## 2023-06-08 NOTE — ED TRIAGE NOTES
Patient presents to ED with c/o chest pain and SOB after waking up this morning with anxiety. She reports having a heavy flow with her menstrual cycle the last few days and having similar symptoms with previous heavy flow periods.

## 2023-06-19 ENCOUNTER — TELEPHONE (OUTPATIENT)
Dept: OBSTETRICS AND GYNECOLOGY | Facility: CLINIC | Age: 43
End: 2023-06-19

## 2023-06-19 ENCOUNTER — TELEPHONE (OUTPATIENT)
Dept: OBSTETRICS AND GYNECOLOGY | Facility: CLINIC | Age: 43
End: 2023-06-19
Payer: MEDICAID

## 2023-06-19 NOTE — TELEPHONE ENCOUNTER
Returned pts call. Pt did not answer, left vm for pt to give the office a call back     ----- Message from Elijah Gonzalez sent at 6/19/2023  1:28 PM CDT -----   Name of Who is Calling:     What is the request in detail: patient request call back in reference to schedule new patient ER follow up appointment / heavy bleeding Please contact to further discuss and advise      Can the clinic reply by [facility]:     What Number to Call Back if not in [facility]:  [phone]

## 2023-06-19 NOTE — TELEPHONE ENCOUNTER
Returned pts call. Pt stated she is having vaginal bleeding and would like to get scheduled as a NP for the bleeding. Vu and appt scheduled    ----- Message from Sita Potter sent at 6/19/2023  3:54 PM CDT -----  Patient Amina Vazquez said that she had a missed call form this office. And would like   for someone to give her a call  back in regards to this matter.  Contact#[phone]                                                    Thank You!!!!

## 2023-06-26 ENCOUNTER — TELEPHONE (OUTPATIENT)
Dept: OBSTETRICS AND GYNECOLOGY | Facility: CLINIC | Age: 43
End: 2023-06-26

## 2023-06-26 ENCOUNTER — TELEPHONE (OUTPATIENT)
Dept: OBSTETRICS AND GYNECOLOGY | Facility: CLINIC | Age: 43
End: 2023-06-26
Payer: MEDICAID

## 2023-06-26 ENCOUNTER — PATIENT MESSAGE (OUTPATIENT)
Dept: OBSTETRICS AND GYNECOLOGY | Facility: CLINIC | Age: 43
End: 2023-06-26

## 2023-06-26 NOTE — TELEPHONE ENCOUNTER
Spoke with pt. Appt r/s and pt scott.   ----- Message from Ana Eric sent at 6/26/2023 12:04 PM CDT -----  Regarding: returning call  Type:  Patient Returning Call    Who Called:Amina     Who Left Message for Patient:Shelley     Does the patient know what this is regarding?:yes    Would the patient rather a call back or a response via [facility]? Call    Best Call Back Number:[phone]    Additional Information:

## 2023-06-26 NOTE — TELEPHONE ENCOUNTER
Called pt in regards to appointment on 8/23 due to Dr. Wei being in surgery. No answer. LVM and portal message.

## 2024-06-16 ENCOUNTER — HOSPITAL ENCOUNTER (OUTPATIENT)
Facility: OTHER | Age: 44
Discharge: HOME | End: 2024-06-19
Attending: EMERGENCY MEDICINE | Admitting: HOSPITALIST
Payer: MEDICAID

## 2024-06-16 DIAGNOSIS — K35.80 ACUTE APPENDICITIS, UNSPECIFIED ACUTE APPENDICITIS TYPE: Primary | ICD-10-CM

## 2024-06-16 DIAGNOSIS — E87.6 HYPOKALEMIA: ICD-10-CM

## 2024-06-16 PROBLEM — K35.30 ACUTE APPENDICITIS WITH LOCALIZED PERITONITIS, WITHOUT PERFORATION, ABSCESS, OR GANGRENE: Status: ACTIVE | Noted: 2024-06-16

## 2024-06-16 LAB
ALBUMIN SERPL BCP-MCNC: 3.8 G/DL (ref 3.5–5.2)
ALP SERPL-CCNC: 52 U/L (ref 55–135)
ALT SERPL W/O P-5'-P-CCNC: 14 U/L (ref 10–44)
ANION GAP SERPL CALC-SCNC: 13 MMOL/L (ref 8–16)
AST SERPL-CCNC: 22 U/L (ref 10–40)
B-HCG UR QL: NEGATIVE
BASOPHILS # BLD AUTO: 0.02 K/UL (ref 0–0.2)
BASOPHILS NFR BLD: 0.2 % (ref 0–1.9)
BILIRUB SERPL-MCNC: 0.3 MG/DL (ref 0.1–1)
BILIRUB UR QL STRIP: NEGATIVE
BUN SERPL-MCNC: 8 MG/DL (ref 6–20)
CALCIUM SERPL-MCNC: 9.1 MG/DL (ref 8.7–10.5)
CHLORIDE SERPL-SCNC: 100 MMOL/L (ref 95–110)
CLARITY UR: CLEAR
CO2 SERPL-SCNC: 22 MMOL/L (ref 23–29)
COLOR UR: YELLOW
CREAT SERPL-MCNC: 0.7 MG/DL (ref 0.5–1.4)
CTP QC/QA: YES
DIFFERENTIAL METHOD BLD: ABNORMAL
EOSINOPHIL # BLD AUTO: 0 K/UL (ref 0–0.5)
EOSINOPHIL NFR BLD: 0 % (ref 0–8)
ERYTHROCYTE [DISTWIDTH] IN BLOOD BY AUTOMATED COUNT: 18.6 % (ref 11.5–14.5)
EST. GFR  (NO RACE VARIABLE): >60 ML/MIN/1.73 M^2
GLUCOSE SERPL-MCNC: 119 MG/DL (ref 70–110)
GLUCOSE UR QL STRIP: NEGATIVE
HCT VFR BLD AUTO: 29.5 % (ref 37–48.5)
HCV AB SERPL QL IA: NEGATIVE
HGB BLD-MCNC: 8.5 G/DL (ref 12–16)
HGB UR QL STRIP: ABNORMAL
HIV 1+2 AB+HIV1 P24 AG SERPL QL IA: NEGATIVE
IMM GRANULOCYTES # BLD AUTO: 0.07 K/UL (ref 0–0.04)
IMM GRANULOCYTES NFR BLD AUTO: 0.5 % (ref 0–0.5)
KETONES UR QL STRIP: NEGATIVE
LEUKOCYTE ESTERASE UR QL STRIP: NEGATIVE
LIPASE SERPL-CCNC: 13 U/L (ref 4–60)
LYMPHOCYTES # BLD AUTO: 0.3 K/UL (ref 1–4.8)
LYMPHOCYTES NFR BLD: 2.4 % (ref 18–48)
MCH RBC QN AUTO: 19.9 PG (ref 27–31)
MCHC RBC AUTO-ENTMCNC: 28.8 G/DL (ref 32–36)
MCV RBC AUTO: 69 FL (ref 82–98)
MICROSCOPIC COMMENT: ABNORMAL
MONOCYTES # BLD AUTO: 0.3 K/UL (ref 0.3–1)
MONOCYTES NFR BLD: 2.2 % (ref 4–15)
NEUTROPHILS # BLD AUTO: 12.1 K/UL (ref 1.8–7.7)
NEUTROPHILS NFR BLD: 94.7 % (ref 38–73)
NITRITE UR QL STRIP: NEGATIVE
NRBC BLD-RTO: 0 /100 WBC
PH UR STRIP: 8 [PH] (ref 5–8)
PLATELET # BLD AUTO: 240 K/UL (ref 150–450)
PMV BLD AUTO: ABNORMAL FL (ref 9.2–12.9)
POTASSIUM SERPL-SCNC: 2.8 MMOL/L (ref 3.5–5.1)
PROT SERPL-MCNC: 7.2 G/DL (ref 6–8.4)
PROT UR QL STRIP: NEGATIVE
RBC # BLD AUTO: 4.27 M/UL (ref 4–5.4)
RBC #/AREA URNS HPF: 35 /HPF (ref 0–4)
SODIUM SERPL-SCNC: 135 MMOL/L (ref 136–145)
SP GR UR STRIP: 1.01 (ref 1–1.03)
SQUAMOUS #/AREA URNS HPF: 4 /HPF
URN SPEC COLLECT METH UR: ABNORMAL
UROBILINOGEN UR STRIP-ACNC: NEGATIVE EU/DL
WBC # BLD AUTO: 12.76 K/UL (ref 3.9–12.7)
WBC #/AREA URNS HPF: 0 /HPF (ref 0–5)

## 2024-06-16 PROCEDURE — 81025 URINE PREGNANCY TEST: CPT | Performed by: NURSE PRACTITIONER

## 2024-06-16 PROCEDURE — 87389 HIV-1 AG W/HIV-1&-2 AB AG IA: CPT | Performed by: EMERGENCY MEDICINE

## 2024-06-16 PROCEDURE — 85025 COMPLETE CBC W/AUTO DIFF WBC: CPT | Performed by: NURSE PRACTITIONER

## 2024-06-16 PROCEDURE — 25000003 PHARM REV CODE 250: Performed by: HOSPITALIST

## 2024-06-16 PROCEDURE — G0378 HOSPITAL OBSERVATION PER HR: HCPCS

## 2024-06-16 PROCEDURE — 63600175 PHARM REV CODE 636 W HCPCS: Performed by: HOSPITALIST

## 2024-06-16 PROCEDURE — 93005 ELECTROCARDIOGRAM TRACING: CPT

## 2024-06-16 PROCEDURE — 96376 TX/PRO/DX INJ SAME DRUG ADON: CPT

## 2024-06-16 PROCEDURE — 86803 HEPATITIS C AB TEST: CPT | Performed by: EMERGENCY MEDICINE

## 2024-06-16 PROCEDURE — 99204 OFFICE O/P NEW MOD 45 MIN: CPT | Mod: ,,, | Performed by: STUDENT IN AN ORGANIZED HEALTH CARE EDUCATION/TRAINING PROGRAM

## 2024-06-16 PROCEDURE — 96366 THER/PROPH/DIAG IV INF ADDON: CPT

## 2024-06-16 PROCEDURE — 83690 ASSAY OF LIPASE: CPT | Performed by: NURSE PRACTITIONER

## 2024-06-16 PROCEDURE — 96367 TX/PROPH/DG ADDL SEQ IV INF: CPT

## 2024-06-16 PROCEDURE — 63600175 PHARM REV CODE 636 W HCPCS: Performed by: NURSE PRACTITIONER

## 2024-06-16 PROCEDURE — 96361 HYDRATE IV INFUSION ADD-ON: CPT

## 2024-06-16 PROCEDURE — 80053 COMPREHEN METABOLIC PANEL: CPT | Performed by: NURSE PRACTITIONER

## 2024-06-16 PROCEDURE — 96365 THER/PROPH/DIAG IV INF INIT: CPT | Mod: 59

## 2024-06-16 PROCEDURE — 99285 EMERGENCY DEPT VISIT HI MDM: CPT | Mod: 25

## 2024-06-16 PROCEDURE — 96375 TX/PRO/DX INJ NEW DRUG ADDON: CPT

## 2024-06-16 PROCEDURE — 25000003 PHARM REV CODE 250: Performed by: NURSE PRACTITIONER

## 2024-06-16 PROCEDURE — 93010 ELECTROCARDIOGRAM REPORT: CPT | Mod: ,,, | Performed by: INTERNAL MEDICINE

## 2024-06-16 PROCEDURE — 25500020 PHARM REV CODE 255: Performed by: EMERGENCY MEDICINE

## 2024-06-16 PROCEDURE — 81000 URINALYSIS NONAUTO W/SCOPE: CPT | Performed by: NURSE PRACTITIONER

## 2024-06-16 RX ORDER — ALUMINUM HYDROXIDE, MAGNESIUM HYDROXIDE, AND SIMETHICONE 1200; 120; 1200 MG/30ML; MG/30ML; MG/30ML
30 SUSPENSION ORAL ONCE
Status: COMPLETED | OUTPATIENT
Start: 2024-06-16 | End: 2024-06-16

## 2024-06-16 RX ORDER — HYDROMORPHONE HYDROCHLORIDE 1 MG/ML
0.5 INJECTION, SOLUTION INTRAMUSCULAR; INTRAVENOUS; SUBCUTANEOUS EVERY 4 HOURS PRN
Status: DISCONTINUED | OUTPATIENT
Start: 2024-06-16 | End: 2024-06-17

## 2024-06-16 RX ORDER — ONDANSETRON HYDROCHLORIDE 2 MG/ML
8 INJECTION, SOLUTION INTRAVENOUS EVERY 8 HOURS PRN
Status: DISCONTINUED | OUTPATIENT
Start: 2024-06-16 | End: 2024-06-19 | Stop reason: HOSPADM

## 2024-06-16 RX ORDER — LIDOCAINE HYDROCHLORIDE 20 MG/ML
15 SOLUTION OROPHARYNGEAL ONCE
Status: COMPLETED | OUTPATIENT
Start: 2024-06-16 | End: 2024-06-16

## 2024-06-16 RX ORDER — ONDANSETRON HYDROCHLORIDE 2 MG/ML
4 INJECTION, SOLUTION INTRAVENOUS
Status: COMPLETED | OUTPATIENT
Start: 2024-06-16 | End: 2024-06-16

## 2024-06-16 RX ORDER — FAMOTIDINE 10 MG/ML
20 INJECTION INTRAVENOUS
Status: COMPLETED | OUTPATIENT
Start: 2024-06-16 | End: 2024-06-16

## 2024-06-16 RX ORDER — TALC
6 POWDER (GRAM) TOPICAL NIGHTLY PRN
Status: DISCONTINUED | OUTPATIENT
Start: 2024-06-16 | End: 2024-06-19 | Stop reason: HOSPADM

## 2024-06-16 RX ORDER — SODIUM CHLORIDE AND POTASSIUM CHLORIDE 300; 900 MG/100ML; MG/100ML
INJECTION, SOLUTION INTRAVENOUS CONTINUOUS
Status: DISCONTINUED | OUTPATIENT
Start: 2024-06-16 | End: 2024-06-18

## 2024-06-16 RX ORDER — DOCUSATE SODIUM 100 MG/1
100 CAPSULE, LIQUID FILLED ORAL 2 TIMES DAILY PRN
Status: ON HOLD | COMMUNITY
Start: 2023-09-03 | End: 2024-06-19 | Stop reason: HOSPADM

## 2024-06-16 RX ORDER — CHOLECALCIFEROL (VITAMIN D3) 25 MCG
1 TABLET ORAL DAILY
COMMUNITY
Start: 2023-09-03 | End: 2024-08-28

## 2024-06-16 RX ORDER — ATORVASTATIN CALCIUM 10 MG/1
10 TABLET, FILM COATED ORAL DAILY
COMMUNITY
Start: 2023-09-03 | End: 2024-06-16

## 2024-06-16 RX ORDER — SODIUM CHLORIDE 0.9 % (FLUSH) 0.9 %
10 SYRINGE (ML) INJECTION
Status: DISCONTINUED | OUTPATIENT
Start: 2024-06-16 | End: 2024-06-19 | Stop reason: HOSPADM

## 2024-06-16 RX ORDER — ERGOCALCIFEROL 1.25 MG/1
1 CAPSULE ORAL
COMMUNITY
Start: 2023-09-03 | End: 2024-06-16

## 2024-06-16 RX ORDER — POLYETHYLENE GLYCOL 3350 17 G/17G
17 POWDER, FOR SOLUTION ORAL DAILY
Status: ON HOLD | COMMUNITY
Start: 2023-09-03 | End: 2024-06-19 | Stop reason: HOSPADM

## 2024-06-16 RX ORDER — NALOXONE HCL 0.4 MG/ML
0.02 VIAL (ML) INJECTION
Status: DISCONTINUED | OUTPATIENT
Start: 2024-06-16 | End: 2024-06-19 | Stop reason: HOSPADM

## 2024-06-16 RX ORDER — SODIUM CHLORIDE, SODIUM LACTATE, POTASSIUM CHLORIDE, CALCIUM CHLORIDE 600; 310; 30; 20 MG/100ML; MG/100ML; MG/100ML; MG/100ML
INJECTION, SOLUTION INTRAVENOUS CONTINUOUS
Status: DISCONTINUED | OUTPATIENT
Start: 2024-06-16 | End: 2024-06-16

## 2024-06-16 RX ORDER — POTASSIUM CHLORIDE 20 MEQ/1
40 TABLET, EXTENDED RELEASE ORAL
Status: COMPLETED | OUTPATIENT
Start: 2024-06-16 | End: 2024-06-16

## 2024-06-16 RX ORDER — HYDROMORPHONE HYDROCHLORIDE 1 MG/ML
0.5 INJECTION, SOLUTION INTRAMUSCULAR; INTRAVENOUS; SUBCUTANEOUS ONCE
Status: COMPLETED | OUTPATIENT
Start: 2024-06-16 | End: 2024-06-16

## 2024-06-16 RX ORDER — POTASSIUM CHLORIDE 7.45 MG/ML
10 INJECTION INTRAVENOUS
Status: COMPLETED | OUTPATIENT
Start: 2024-06-16 | End: 2024-06-16

## 2024-06-16 RX ADMIN — PIPERACILLIN SODIUM AND TAZOBACTAM SODIUM 4.5 G: 4; .5 INJECTION, POWDER, LYOPHILIZED, FOR SOLUTION INTRAVENOUS at 03:06

## 2024-06-16 RX ADMIN — POTASSIUM CHLORIDE 10 MEQ: 7.46 INJECTION, SOLUTION INTRAVENOUS at 04:06

## 2024-06-16 RX ADMIN — PIPERACILLIN SODIUM AND TAZOBACTAM SODIUM 4.5 G: 4; .5 INJECTION, POWDER, LYOPHILIZED, FOR SOLUTION INTRAVENOUS at 09:06

## 2024-06-16 RX ADMIN — ONDANSETRON 4 MG: 2 INJECTION INTRAMUSCULAR; INTRAVENOUS at 01:06

## 2024-06-16 RX ADMIN — POTASSIUM CHLORIDE 40 MEQ: 1500 TABLET, EXTENDED RELEASE ORAL at 03:06

## 2024-06-16 RX ADMIN — FAMOTIDINE 20 MG: 10 INJECTION, SOLUTION INTRAVENOUS at 01:06

## 2024-06-16 RX ADMIN — ALUMINUM HYDROXIDE, MAGNESIUM HYDROXIDE, AND SIMETHICONE 30 ML: 1200; 120; 1200 SUSPENSION ORAL at 01:06

## 2024-06-16 RX ADMIN — POTASSIUM CHLORIDE: 149 INJECTION, SOLUTION, CONCENTRATE INTRAVENOUS at 09:06

## 2024-06-16 RX ADMIN — SODIUM CHLORIDE 1000 ML: 0.9 INJECTION, SOLUTION INTRAVENOUS at 01:06

## 2024-06-16 RX ADMIN — HYDROMORPHONE HYDROCHLORIDE 0.5 MG: 0.5 INJECTION, SOLUTION INTRAMUSCULAR; INTRAVENOUS; SUBCUTANEOUS at 04:06

## 2024-06-16 RX ADMIN — POTASSIUM CHLORIDE: 149 INJECTION, SOLUTION, CONCENTRATE INTRAVENOUS at 05:06

## 2024-06-16 RX ADMIN — IOHEXOL 75 ML: 350 INJECTION, SOLUTION INTRAVENOUS at 02:06

## 2024-06-16 RX ADMIN — LIDOCAINE HYDROCHLORIDE 15 ML: 20 SOLUTION ORAL; TOPICAL at 01:06

## 2024-06-16 RX ADMIN — HYDROMORPHONE HYDROCHLORIDE 0.5 MG: 0.5 INJECTION, SOLUTION INTRAMUSCULAR; INTRAVENOUS; SUBCUTANEOUS at 09:06

## 2024-06-16 NOTE — CONSULTS
Colon & Rectal Surgery Consultation     HISTORY OF PRESENT ILLNESS:  Amina Vazquez is a 43 y.o. female with a history of HTN, anxiety and  x3 who presents with 11 hours of periumbilical/infraumbilical pain with nausea/vomiting and CT evidence of acute uncomplicated appendicitis without perforation or phlegmon.    The patient describes gradual-onset, periumbilical pain that is constant and progressive in nature. She notes pain started periumbilically but is now more diffuse throughout her abdomen with specific focus of most severe pain infraumbilically/RLQ. Associated symptoms include:    Acute Symptoms:  Fever    Yes:  [] No:  [x]   Chills +/- sweats  Yes:  [] No:  [x]   Anorexia   Yes:  [] No:  [x]   Nausea   Yes:  [x] No:  []   Vomiting   Yes:  [x] No:  []   Most recently: 2 hours ago     Bloody: Yes:  [] No:  [x]     Bilious:  Yes:  [] No:  [x]      Diarrhea   Yes:  [] No:  [x]   Constipation   Yes:  [] No:  [x]   Blood per rectum  Yes:  [] No:  [x]     Subacute Symptoms:  Weight loss   Yes:  [] No:  [x]   Night sweats   Yes:  [] No:  [x]   Early satiety   Yes:  [] No:  [x]   Food fear   Yes:  [] No:  [x]   Change in bowel habits Yes:  [] No:  [x]   Blood per rectum  Yes:  [] No:  [x]     History of Perianal Disease:   Yes:  [] No:  [x]     Prior Colonoscopy:   Yes:  [] No:  [x]     Family History:  Cancer:  Family history of colorectal cancer: Yes:  [] No:  [x]   IBD:  Family history of Crohn's disease: Yes:  [] No:  [x]   Family history of ulcerative colitis: Yes:  [] No:  [x]   Surgery:  Family history of bleeding diatheses: Yes:  [] No:  [x]     Perioperative Risk Factors:   Prior abdominal surgery Yes:  [x] No:  []   x3   Diabetes   Yes:  [] No:  [x]    HIV    Yes:  [] No:  [x]   Immunosuppression  Yes:  [] No:  [x]   Anticoagulation  Yes:  [] No:  [x]   Current tobacco use  Yes:  [] No:  [x]   Current alcohol use  Yes:  [] No:  [x]     NPO Status:  Last solid intake:  6/15/24  Last  liquid intake: 14:30 24    PAST MEDICAL HISTORY:  Past Medical History:   Diagnosis Date    Anemia     Hypertension      PAST SURGICAL HISTORY:  Past Surgical History:   Procedure Laterality Date     SECTION      x 2     MEDICATIONS:  No current facility-administered medications on file prior to encounter.     Current Outpatient Medications on File Prior to Encounter   Medication Sig Dispense Refill    cetirizine (ZYRTEC) 10 MG tablet Take 1 tablet (10 mg total) by mouth once daily. 30 tablet 0    ferrous sulfate 325 mg (65 mg iron) Tab tablet Take 1 tablet (325 mg total) by mouth once daily. 12 tablet 0    hydroCHLOROthiazide (HYDRODIURIL) 25 MG tablet Take 25 mg by mouth.      hydrOXYzine HCL (ATARAX) 25 MG tablet Take 1 tablet (25 mg total) by mouth 4 (four) times daily as needed for Anxiety (Take 1 or 2 tablets 4 times daily as needed for anxiety. Do not mix with Benadryl). 60 tablet 0    nitrofurantoin, macrocrystal-monohydrate, (MACROBID) 100 MG capsule TK 1 C PO Q 12 H WITH FOOD FOR 7 DAYS  0     ALLERGIES:  Review of patient's allergies indicates:  No Known Allergies    FAMILY HISTORY:  Family History   Problem Relation Name Age of Onset    Hypertension Mother      Diabetes Neg Hx      Cancer Neg Hx      Stroke Neg Hx      Heart disease Neg Hx       SOCIAL HISTORY:  Social History     Tobacco Use    Smoking status: Never    Smokeless tobacco: Never   Substance Use Topics    Alcohol use: No    Drug use: No     PHYSICAL EXAMINATION:  Blood pressure (!) 168/98, pulse 72, temperature 98.5 °F (36.9 °C), temperature source Oral, resp. rate 18, SpO2 100%, not currently breastfeeding.  There is no height or weight on file to calculate BMI.  Well-developed, well nourished, in no acute distress  HEENT: Sclerae anicteric, trachea midline  Pulm: Normal respiratory rate and effort on room air  Abd:   Soft, non-distended, mod TTP in infraumbilical/RLQ, mild diffuse TTP throughout rest of abdomen without  "guarding or peritonitis. No organomegaly or masses. Well-healed Pfannenstiel incision. No umbilical or ventral hernias.  Ext:   Warm and well perfused. No upper or lower extremity edema bilaterally.  Neuro: Grossly intact with no focal neurological deficit.    LABORATORY RESULTS:  Complete Blood Count:  Recent Labs   Lab 06/16/24  1301   WBC 12.76*   RBC 4.27   HGB 8.5*   HCT 29.5*        CRP:  No results for input(s): "CRP" in the last 72 hours.    Basic Chemistry Panel:  Recent Labs   Lab 06/16/24  1301   *   K 2.8*      CO2 22*   BUN 8   CREATININE 0.7   CALCIUM 9.1     Hepatic Panel:  Recent Labs   Lab 06/16/24  1301   AST 22   ALT 14   ALKPHOS 52*   BILITOT 0.3   ALBUMIN 3.8     Nutrition Labs:  Recent Labs   Lab 06/16/24  1301   ALBUMIN 3.8     Urine pregnancy test: Negative    IMAGING RESULTS:  CT Abd/Pelv w IV Contrast (6/16/24): personally reviewed  1. Findings concerning for acute, uncomplicated appendicitis.  Surgical consultation advised.  2. Hepatic few scattered subcentimeter hypoattenuating parenchymal foci which are too small to characterize.  3. Borderline hepatosplenomegaly.  4. Suspected uterine fibroids.    IMPRESSION:  Acute uncomplicated appendicitis without perforation or phlegmon    PLAN:  We discussed the pathophysiology of acute appendicitis in detail. We discussed that appendectomy is considered standard treatment for acute uncomplicated appendicitis but that nonoperative treatment with antibiotics is an option. We discussed the outcomes of a nonoperative management strategy, including a longer hospital length of stay, 7-day failure rates requiring invasive intervention between 9-12%, and rates of recurrent appendicitis requiring subsequent appendectomy in 25% and 38% of patients at 1- and 2-years, respectively, with potential risk for more complicated appendicitis at the time of recurrence. We also discussed the 0.7-1.7% risk of identifying an incidental appendiceal " neoplasm at the time of appendectomy for acute appendicitis with increased risk in patients over the age of 40.     We also discussed laparoscopic appendectomy in detail. We discussed the risks, including but not limited to bleeding, infection, injury to surrounding structures, conversion to open surgery as well as future risk of bowel obstruction and incisional hernia. We discussed how perforated appendicitis may be found intraoperatively which would increase these risks, including a 10-25% risk of intra-abdominal infection postoperatively as well as a higher likelihood of open surgery, ileocolectomy and staple line dehiscence/leak. Finally, we discussed how there is around a 5% chance we find a disease other than appendicitis at the time of surgery with a normal appearing appendix; we discussed how, if this were the case, we would still proceed with appendectomy to minimize diagnostic confusion     She understands the risks, benefits and alternative to surgery and, following extensive discussion with her family, wishes to proceed with nonoperative treatment with antibiotics. We discussed how appendectomy can be performed within 24 hours of presentation without higher rates of complications and will re-visit potential surgical intervention in the morning if she is not feeling improved or has changed her mind.    Plan to pursue nonoperative management with NPO/IVF and ongoing Zosyn antibiosis. We will obtain serial morning labs including CBC/CRP and monitor with serial abdominal exams.    Charles Bucio MD  Staff Surgeon

## 2024-06-16 NOTE — ASSESSMENT & PLAN NOTE
Chronic at around baseline although suspect probably hemoconcentrated at this time.  Patient advised to undergo partial hysterectomy previously but declined.  On chronic oral iron replacement.  She reports recent having menses with moderately heavy bleeding.  Blood consent obtained for transfusion if indicated.

## 2024-06-16 NOTE — ED PROVIDER NOTES
Source of History:  Patient     Chief complaint:  Abdominal Pain (Pt reports n/v and abdominal pain after eating fried chicken last night. )      HPI:  Amina Vazquez is a 43 y.o. female presenting to the emergency department reporting nausea and vomiting that began around 5:00 a.m. she reports epigastric burning sensation bilateral lower abdominal pain.  Denies any diarrhea or any fever/chills.    This is the extent to the patients complaints today here in the emergency department.    PMH:  As per HPI and below:  Past Medical History:   Diagnosis Date    Anemia     Hypertension      Past Surgical History:   Procedure Laterality Date     SECTION      x 2       Social History     Tobacco Use    Smoking status: Never    Smokeless tobacco: Never   Substance Use Topics    Alcohol use: No    Drug use: No       Review of patient's allergies indicates:  No Known Allergies    ROS: As per HPI and below:  General: No  fever.  No chills.    ENT: No sore throat. No ear pain  Chest: No shortness of breath. No cough.    Cardiovascular: No chest pain.   Abdomen:  Abdominal pain, nausea vomiting  Genito-Urinary: No abnormal urination.    Neurologic: No focal weakness.  No numbness.  No headache  MSK: no back pain.   Integument: No rashes or lesions.    Physical Exam:    BP (!) 168/98 (BP Location: Left arm, Patient Position: Lying) Comment: Simultaneous filing. User may not have seen previous data.  Pulse 72 Comment: Simultaneous filing. User may not have seen previous data.  Temp 98.5 °F (36.9 °C) (Oral) Comment: Simultaneous filing. User may not have seen previous data. Comment (Src): Simultaneous filing. User may not have seen previous data.  Resp 18 Comment: Simultaneous filing. User may not have seen previous data.  SpO2 100% Comment: Simultaneous filing. User may not have seen previous data.  Breastfeeding No   Vitals:    24 1203   BP: (!) 168/98   Pulse: 72   Resp: 18   Temp: 98.5 °F (36.9 °C)    TempSrc: Oral   SpO2: 100%       Nursing note and vital signs reviewed.  Appearance: No acute distress. Well-appearing. Non-toxic.    Eyes: No conjunctival injection.  Extraocular muscles are intact.  ENT:  Mucous membranes are pink and moist  Chest/ Respiratory: Clear to auscultation bilaterally.  Good air movement.  No wheezes.  No rhonchi. No rales. No accessory muscle use.  Cardiovascular: Regular rate and rhythm.  No murmurs. No gallops. No rubs.  Abdomen: Soft.  Mild right and lower abdominal tenderness without distention or guarding.  Back:  No CVA tender  Musculoskeletal: Good range of motion all joints.  No deformities.  Neck supple.  No meningismus.  Skin: No rashes seen.  Good turgor.  No abrasions.  No ecchymoses.  Neuro: alert and oriented x3,  no focal neurological deficits.  Psych: Appropriate, conversant  Labs Reviewed   CBC W/ AUTO DIFFERENTIAL - Abnormal; Notable for the following components:       Result Value    WBC 12.76 (*)     Hemoglobin 8.5 (*)     Hematocrit 29.5 (*)     MCV 69 (*)     MCH 19.9 (*)     MCHC 28.8 (*)     RDW 18.6 (*)     Gran # (ANC) 12.1 (*)     Immature Grans (Abs) 0.07 (*)     Lymph # 0.3 (*)     Gran % 94.7 (*)     Lymph % 2.4 (*)     Mono % 2.2 (*)     All other components within normal limits    Narrative:     Release to patient->Immediate   COMPREHENSIVE METABOLIC PANEL - Abnormal; Notable for the following components:    Sodium 135 (*)     Potassium 2.8 (*)     CO2 22 (*)     Glucose 119 (*)     Alkaline Phosphatase 52 (*)     All other components within normal limits    Narrative:     Release to patient->Immediate   URINALYSIS, REFLEX TO URINE CULTURE - Abnormal; Notable for the following components:    Occult Blood UA 3+ (*)     All other components within normal limits    Narrative:     Specimen Source->Urine   URINALYSIS MICROSCOPIC - Abnormal; Notable for the following components:    RBC, UA 35 (*)     All other components within normal limits    Narrative:      Specimen Source->Urine   HIV 1 / 2 ANTIBODY    Narrative:     Release to patient->Immediate   HEPATITIS C ANTIBODY    Narrative:     Release to patient->Immediate   LIPASE    Narrative:     Release to patient->Immediate   POCT URINE PREGNANCY       CT Abdomen Pelvis With IV Contrast NO Oral Contrast   Final Result      1. Findings concerning for acute, uncomplicated appendicitis.  Surgical consultation advised.   2. Hepatic few scattered subcentimeter hypoattenuating parenchymal foci which are too small to characterize.   3. Borderline hepatosplenomegaly.   4. Suspected uterine fibroids.         Electronically signed by: Sebas Alex MD   Date:    06/16/2024   Time:    14:49        EKG:  No ectopy, no acute ischemia, no arrhythmias.  Heart rate 66.    Initial Impression/ Differential Dx:  Differential diagnosis includes but not limited to: GERD, intestinal spasm, gastroenteritis, gastritis, ulcer, cholecystitis, cholelithiasis, gallstones, pancreatitis, ileus, small bowel obstruction, appendicitis, diverticulitis, colitis, constipation, intestinal gas pain      MDM:    Medical Decision Making  43-year-old female with nausea vomiting that began around 5:00 a.m. with epigastric burning and lower abdominal pain that began after she ate some fried chicken.  She denies any fever.  Reports able tolerate some oral intake.  On exam there is some mild right and lower abdominal tenderness without guarding or distention.  Labs reveal leukocytosis of 12.7, anemia which appears baseline for the patient.  She also has a hypokalemia of 2.8 which was replaced both orally and IV.  EKG without evidence of arrhythmias or ischemia.  CT reveals acute appendicitis without perforation or abscess.  Spoke with General surgery who come evaluate the patient.  Initiated Zosyn in the ED patient will be admitted to Hospital Medicine for continued treatment    Amount and/or Complexity of Data Reviewed  Labs: ordered. Decision-making details  documented in ED Course.  Radiology: ordered.    Risk  OTC drugs.  Prescription drug management.            ED Course as of 06/16/24 1557   Sun Jun 16, 2024   1328 BP(!): 168/98 [AT]   1328 Temp: 98.5 °F (36.9 °C) [AT]   1328 Temp Source: Oral [AT]   1328 Pulse: 72 [AT]   1328 Resp: 18 [AT]   1328 SpO2: 100 % [AT]   1347 Hemoglobin(!): 8.5 [AS]   1347 Hematocrit(!): 29.5  At baseline for patient. [AS]   1347 Sodium(!): 135 [AS]   1347 Potassium(!): 2.8 [AS]   1516 RBC, UA(!): 35  Patient is on her menstrual psych [AS]      ED Course User Index  [AS] Conchis Thompson, NANCY  [AT] Ita Roth FNP       Diagnostic Impression:    1. Acute appendicitis, unspecified acute appendicitis type    2. Hypokalemia         ED Disposition Condition    Observation Stable                    Conchis Thompson, TORRESP  06/16/24 1557

## 2024-06-16 NOTE — H&P
[facility]  History & Physical    Patient Name: Amina Vazquez  MRN: [id number]  Patient Class: OP- Observation  Admission Date: 2024  Attending Physician: Jeremiah Hendrickson MD          Patient information was obtained from patient, past medical records, and ER records.     Subjective:     Principal Problem:Acute appendicitis with localized peritonitis, without perforation, abscess, or gangrene    Chief Complaint:   Chief Complaint   Patient presents with    Abdominal Pain     Pt reports n/v and abdominal pain after eating fried chicken last night.         HPI: Patient is a 44 year woman with history of hypertension, dyslipidemia, history of menorrhagia and uterine leiomyoma with chronic blood loss resulting in iron-deficiency anemia who presents to the emergency department with nausea, vomiting, and abdominal pain.  Patient reports she has been having some intermittent abdominal discomfort but markedly worse since 5 am this morning.  Denies any diarrhea.  Patient reports prior  sections but otherwise no other prior surgeries.  Patient reports she took at Tramadol 50 mg by mouth this morning due severity of the pain with some improvement.    Denies any tobacco use, alcohol use, or illicit drug.  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  No sick contacts.  No recent travel.    CT scan shows evidence of acute appendicitis.    Past Medical History:   Diagnosis Date    Anemia     Hypertension        Past Surgical History:   Procedure Laterality Date     SECTION      x 2       Review of patient's allergies indicates:  No Known Allergies    No current facility-administered medications on file prior to encounter.     Current Outpatient Medications on File Prior to Encounter   Medication Sig    atorvastatin (LIPITOR) 10 MG tablet Take 10 mg by mouth once daily.    docusate sodium (COLACE) 100 MG capsule Take 100 mg by mouth 2 (two) times daily as needed for  Constipation.    ergocalciferol (ERGOCALCIFEROL) 50,000 unit Cap Take 1 capsule by mouth every 7 days.    polyethylene glycol (GLYCOLAX) 17 gram/dose powder Take 17 g by mouth once daily.    vitamin D (VITAMIN D3) 1000 units Tab Take 1 tablet by mouth once daily.    cetirizine (ZYRTEC) 10 MG tablet Take 1 tablet (10 mg total) by mouth once daily.    ferrous sulfate 325 mg (65 mg iron) Tab tablet Take 1 tablet (325 mg total) by mouth once daily.    hydroCHLOROthiazide (HYDRODIURIL) 25 MG tablet Take 25 mg by mouth.    hydrOXYzine HCL (ATARAX) 25 MG tablet Take 1 tablet (25 mg total) by mouth 4 (four) times daily as needed for Anxiety (Take 1 or 2 tablets 4 times daily as needed for anxiety. Do not mix with Benadryl).    nitrofurantoin, macrocrystal-monohydrate, (MACROBID) 100 MG capsule TK 1 C PO Q 12 H WITH FOOD FOR 7 DAYS     Family History       Problem Relation (Age of Onset)    Hypertension Mother          Tobacco Use    Smoking status: Never    Smokeless tobacco: Never   Substance and Sexual Activity    Alcohol use: No    Drug use: No    Sexual activity: Yes     Partners: Female     Review of Systems   Constitutional:  Negative for chills and fever.   HENT:  Negative for congestion, hearing loss, sinus pressure and trouble swallowing.    Eyes:  Negative for photophobia, pain, redness and visual disturbance.   Respiratory:  Negative for cough, chest tightness, shortness of breath and wheezing.    Cardiovascular:  Negative for chest pain and palpitations.   Gastrointestinal:  Positive for abdominal pain, diarrhea and nausea. Negative for abdominal distention, blood in stool, constipation and vomiting.   Endocrine: Negative for cold intolerance, heat intolerance, polydipsia, polyphagia and polyuria.   Genitourinary:  Negative for dysuria and frequency.   Musculoskeletal:  Negative for arthralgias, back pain, gait problem, joint swelling, myalgias and neck pain.   Allergic/Immunologic: Negative for environmental  allergies, food allergies and immunocompromised state.   Neurological:  Negative for dizziness, seizures, syncope, weakness, light-headedness and headaches.   Hematological:  Negative for adenopathy.   Psychiatric/Behavioral:  Negative for agitation, behavioral problems and confusion.      Objective:     Vital Signs (Most Recent):  Temp: 98.5 °F (36.9 °C) (Simultaneous filing. User may not have seen previous data.) (06/16/24 1203)  Pulse: 72 (Simultaneous filing. User may not have seen previous data.) (06/16/24 1203)  Resp: 18 (Simultaneous filing. User may not have seen previous data.) (06/16/24 1203)  BP: (!) 168/98 (Simultaneous filing. User may not have seen previous data.) (06/16/24 1203)  SpO2: 100 % (Simultaneous filing. User may not have seen previous data.) (06/16/24 1203) Vital Signs (24h Range):  Temp:  [98.5 °F (36.9 °C)] 98.5 °F (36.9 °C)  Pulse:  [72] 72  Resp:  [18] 18  SpO2:  [100 %] 100 %  BP: (168)/(98) 168/98        There is no height or weight on file to calculate BMI.     Physical Exam  Constitutional:       General: She is not in acute distress.     Appearance: She is ill-appearing. She is not diaphoretic.      Comments: Uncomfortable appearing   HENT:      Head: Normocephalic and atraumatic.      Nose: Nose normal.      Mouth/Throat:      Mouth: Mucous membranes are dry.      Pharynx: No oropharyngeal exudate.   Eyes:      General: No scleral icterus.        Right eye: No discharge.         Left eye: No discharge.      Conjunctiva/sclera: Conjunctivae normal.      Pupils: Pupils are equal, round, and reactive to light.   Neck:      Thyroid: No thyromegaly.      Vascular: No JVD.      Trachea: No tracheal deviation.   Cardiovascular:      Rate and Rhythm: Normal rate and regular rhythm.      Heart sounds: Normal heart sounds. No murmur heard.     No friction rub. No gallop.   Pulmonary:      Effort: Pulmonary effort is normal. No respiratory distress.      Breath sounds: Normal breath sounds.  No stridor. No wheezing or rales.   Chest:      Chest wall: No tenderness.   Abdominal:      Tenderness: There is abdominal tenderness.   Musculoskeletal:         General: No tenderness. Normal range of motion.      Cervical back: Normal range of motion and neck supple.   Lymphadenopathy:      Cervical: No cervical adenopathy.   Skin:     General: Skin is warm and dry.      Coloration: Skin is not pale.      Findings: No erythema or rash.   Neurological:      Mental Status: She is alert and oriented to person, place, and time.      Cranial Nerves: No cranial nerve deficit.      Motor: No abnormal muscle tone.      Coordination: Coordination normal.      Deep Tendon Reflexes: Reflexes are normal and symmetric. Reflexes normal.   Psychiatric:         Behavior: Behavior normal.         Thought Content: Thought content normal.         Judgment: Judgment normal.              CRANIAL NERVES     CN III, IV, VI   Pupils are equal, round, and reactive to light.       Significant Labs: All pertinent labs within the past 24 hours have been reviewed.    Significant Imaging: I have reviewed all pertinent imaging results/findings within the past 24 hours.  Assessment/Plan:     * Acute appendicitis with localized peritonitis, without perforation, abscess, or gangrene  Patient presents with acute onset of nausea, vomiting, and abdominal pain.  Urine pregnancy test negative.  CT scan finding as showing evidence of acute appendicitis.  NPO.  Intravenous antibiotics.  Pain medication.  General surgery consultation.    Hypertension  Hypertensive.  Treat pain.  Treat with long-acting calcium channel blocker if hypertensive after controlled pain.  Discontinue thiazide diuretic due to hypokalemia.    Hypokalemia  Hold diuretics.  Replace potassium.    Iron deficiency anemia due to chronic blood loss  Chronic at around baseline although suspect probably hemoconcentrated at this time.  Patient advised to undergo partial hysterectomy  previously but declined.  On chronic oral iron replacement.  She reports recent having menses with moderately heavy bleeding.  Blood consent obtained for transfusion if indicated.      DVT prophylaxis.  Thrombo Embolic Deterrent hose (GALLO® hose) and sequential compression devices for now pending possible surgery.      On 06/16/2024, patient should be placed in hospital observation services under my care.         Jeremiah Hendrickson MD  Department of Hospital Medicine  [facility] - Emergency Dept

## 2024-06-16 NOTE — FIRST PROVIDER EVALUATION
Emergency Department TeleTriage Encounter Note      CHIEF COMPLAINT    Chief Complaint   Patient presents with    Abdominal Pain     Pt reports n/v and abdominal pain after eating fried chicken last night.        VITAL SIGNS   Initial Vitals [06/16/24 1203]   BP Pulse Resp Temp SpO2   (!) 168/98 72 18 98.5 °F (36.9 °C) 100 %      MAP       --            ALLERGIES    Review of patient's allergies indicates:  No Known Allergies    PROVIDER TRIAGE NOTE  This is a teletriage evaluation of a 43 y.o. female presenting to the ED complaining of abd pain and vomiting since eating fried chicken last night. No diarrhea.     Alert, no distress.     Initial orders will be placed and care will be transferred to an alternate provider when patient is roomed for a full evaluation. Any additional orders and the final disposition will be determined by that provider.         ORDERS  Labs Reviewed   HIV 1 / 2 ANTIBODY   HEPATITIS C ANTIBODY       ED Orders (720h ago, onward)      Start Ordered     Status Ordering Provider    06/16/24 1230 06/16/24 1219  sodium chloride 0.9% bolus 1,000 mL 1,000 mL  Once         Ordered JUAN FISHER N.    06/16/24 1230 06/16/24 1219  ondansetron injection 4 mg  ED 1 Time         Ordered JUAN FISHER N.    06/16/24 1220 06/16/24 1219  Vital signs  Every 2 hours         Ordered JUAN FISHER N.    06/16/24 1220 06/16/24 1219  Diet NPO  Diet effective now         Ordered JUAN FISHER N.    06/16/24 1220 06/16/24 1219  Insert peripheral IV  Once         Ordered JUAN FISHER N.    06/16/24 1220 06/16/24 1219  CBC W/ AUTO DIFFERENTIAL  STAT         Ordered JUAN FISHER N.    06/16/24 1220 06/16/24 1219  Comp. Metabolic Panel  STAT         Ordered JUAN FISHER N.    06/16/24 1220 06/16/24 1219  Lipase  STAT         Ordered JUAN FISHER.    06/16/24 1220 06/16/24 1219  Urinalysis, Reflex to Urine Culture Urine, Clean Catch  STAT          Ordered JUAN FISHER N.    06/16/24 1220 06/16/24 1219  POCT urine pregnancy  Once         Ordered JUAN FISHER N.    06/16/24 1205 06/16/24 1204  HIV 1/2 Ag/Ab (4th Gen)  STAT         Ordered MANDI BRISENO    06/16/24 1205 06/16/24 1204  Hepatitis C Antibody  STAT         Ordered MANDI BRISENO              Virtual Visit Note: The provider triage portion of this emergency department evaluation and documentation was performed via [employer], a HIPAA-compliant telemedicine application, in concert with a tele-presenter in the room. A face to face patient evaluation with one of my colleagues will occur once the patient is placed in an emergency department room.      DISCLAIMER: This note was prepared with [employer] voice recognition transcription software. Garbled syntax, mangled pronouns, and other bizarre constructions may be attributed to that software system.

## 2024-06-16 NOTE — SUBJECTIVE & OBJECTIVE
Past Medical History:   Diagnosis Date    Anemia     Hypertension        Past Surgical History:   Procedure Laterality Date     SECTION      x 2       Review of patient's allergies indicates:  No Known Allergies    No current facility-administered medications on file prior to encounter.     Current Outpatient Medications on File Prior to Encounter   Medication Sig    atorvastatin (LIPITOR) 10 MG tablet Take 10 mg by mouth once daily.    docusate sodium (COLACE) 100 MG capsule Take 100 mg by mouth 2 (two) times daily as needed for Constipation.    ergocalciferol (ERGOCALCIFEROL) 50,000 unit Cap Take 1 capsule by mouth every 7 days.    polyethylene glycol (GLYCOLAX) 17 gram/dose powder Take 17 g by mouth once daily.    vitamin D (VITAMIN D3) 1000 units Tab Take 1 tablet by mouth once daily.    cetirizine (ZYRTEC) 10 MG tablet Take 1 tablet (10 mg total) by mouth once daily.    ferrous sulfate 325 mg (65 mg iron) Tab tablet Take 1 tablet (325 mg total) by mouth once daily.    hydroCHLOROthiazide (HYDRODIURIL) 25 MG tablet Take 25 mg by mouth.    hydrOXYzine HCL (ATARAX) 25 MG tablet Take 1 tablet (25 mg total) by mouth 4 (four) times daily as needed for Anxiety (Take 1 or 2 tablets 4 times daily as needed for anxiety. Do not mix with Benadryl).    nitrofurantoin, macrocrystal-monohydrate, (MACROBID) 100 MG capsule TK 1 C PO Q 12 H WITH FOOD FOR 7 DAYS     Family History       Problem Relation (Age of Onset)    Hypertension Mother          Tobacco Use    Smoking status: Never    Smokeless tobacco: Never   Substance and Sexual Activity    Alcohol use: No    Drug use: No    Sexual activity: Yes     Partners: Female     Review of Systems   Constitutional:  Negative for chills and fever.   HENT:  Negative for congestion, hearing loss, sinus pressure and trouble swallowing.    Eyes:  Negative for photophobia, pain, redness and visual disturbance.   Respiratory:  Negative for cough, chest tightness, shortness of  breath and wheezing.    Cardiovascular:  Negative for chest pain and palpitations.   Gastrointestinal:  Positive for abdominal pain, diarrhea and nausea. Negative for abdominal distention, blood in stool, constipation and vomiting.   Endocrine: Negative for cold intolerance, heat intolerance, polydipsia, polyphagia and polyuria.   Genitourinary:  Negative for dysuria and frequency.   Musculoskeletal:  Negative for arthralgias, back pain, gait problem, joint swelling, myalgias and neck pain.   Allergic/Immunologic: Negative for environmental allergies, food allergies and immunocompromised state.   Neurological:  Negative for dizziness, seizures, syncope, weakness, light-headedness and headaches.   Hematological:  Negative for adenopathy.   Psychiatric/Behavioral:  Negative for agitation, behavioral problems and confusion.      Objective:     Vital Signs (Most Recent):  Temp: 98.5 °F (36.9 °C) (Simultaneous filing. User may not have seen previous data.) (06/16/24 1203)  Pulse: 72 (Simultaneous filing. User may not have seen previous data.) (06/16/24 1203)  Resp: 18 (Simultaneous filing. User may not have seen previous data.) (06/16/24 1203)  BP: (!) 168/98 (Simultaneous filing. User may not have seen previous data.) (06/16/24 1203)  SpO2: 100 % (Simultaneous filing. User may not have seen previous data.) (06/16/24 1203) Vital Signs (24h Range):  Temp:  [98.5 °F (36.9 °C)] 98.5 °F (36.9 °C)  Pulse:  [72] 72  Resp:  [18] 18  SpO2:  [100 %] 100 %  BP: (168)/(98) 168/98        There is no height or weight on file to calculate BMI.     Physical Exam  Constitutional:       General: She is not in acute distress.     Appearance: She is ill-appearing. She is not diaphoretic.      Comments: Uncomfortable appearing   HENT:      Head: Normocephalic and atraumatic.      Nose: Nose normal.      Mouth/Throat:      Mouth: Mucous membranes are dry.      Pharynx: No oropharyngeal exudate.   Eyes:      General: No scleral icterus.         Right eye: No discharge.         Left eye: No discharge.      Conjunctiva/sclera: Conjunctivae normal.      Pupils: Pupils are equal, round, and reactive to light.   Neck:      Thyroid: No thyromegaly.      Vascular: No JVD.      Trachea: No tracheal deviation.   Cardiovascular:      Rate and Rhythm: Normal rate and regular rhythm.      Heart sounds: Normal heart sounds. No murmur heard.     No friction rub. No gallop.   Pulmonary:      Effort: Pulmonary effort is normal. No respiratory distress.      Breath sounds: Normal breath sounds. No stridor. No wheezing or rales.   Chest:      Chest wall: No tenderness.   Abdominal:      Tenderness: There is abdominal tenderness.   Musculoskeletal:         General: No tenderness. Normal range of motion.      Cervical back: Normal range of motion and neck supple.   Lymphadenopathy:      Cervical: No cervical adenopathy.   Skin:     General: Skin is warm and dry.      Coloration: Skin is not pale.      Findings: No erythema or rash.   Neurological:      Mental Status: She is alert and oriented to person, place, and time.      Cranial Nerves: No cranial nerve deficit.      Motor: No abnormal muscle tone.      Coordination: Coordination normal.      Deep Tendon Reflexes: Reflexes are normal and symmetric. Reflexes normal.   Psychiatric:         Behavior: Behavior normal.         Thought Content: Thought content normal.         Judgment: Judgment normal.              CRANIAL NERVES     CN III, IV, VI   Pupils are equal, round, and reactive to light.       Significant Labs: All pertinent labs within the past 24 hours have been reviewed.    Significant Imaging: I have reviewed all pertinent imaging results/findings within the past 24 hours.

## 2024-06-16 NOTE — ED TRIAGE NOTES
Pt presents to ED c/o N/V and generalized abd pain onset last night s/p eating some fried  chicken./ Pt states she thinks she may have gotten food poisoning from it. Pt appears lethargic during assessment.

## 2024-06-16 NOTE — ASSESSMENT & PLAN NOTE
Hypertensive.  Treat pain.  Treat with long-acting calcium channel blocker if hypertensive after controlled pain.  Discontinue thiazide diuretic due to hypokalemia.

## 2024-06-16 NOTE — ASSESSMENT & PLAN NOTE
Patient presents with acute onset of nausea, vomiting, and abdominal pain.  Urine pregnancy test negative.  CT scan finding as showing evidence of acute appendicitis.  NPO.  Intravenous antibiotics.  Pain medication.  General surgery consultation.

## 2024-06-16 NOTE — HPI
Patient is a 44 year woman with history of hypertension, dyslipidemia, history of menorrhagia and uterine leiomyoma with chronic blood loss resulting in iron-deficiency anemia who presents to the emergency department with nausea, vomiting, and abdominal pain.  Patient reports she has been having some intermittent abdominal discomfort but markedly worse since 5 am this morning.  Denies any diarrhea.  Patient reports prior  sections but otherwise no other prior surgeries.  Patient reports she took at Tramadol 50 mg by mouth this morning due severity of the pain with some improvement.    Denies any tobacco use, alcohol use, or illicit drug.  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  No sick contacts.  No recent travel.    CT scan shows evidence of acute appendicitis.

## 2024-06-17 LAB
ANION GAP SERPL CALC-SCNC: 5 MMOL/L (ref 8–16)
BASOPHILS # BLD AUTO: 0.01 K/UL (ref 0–0.2)
BASOPHILS NFR BLD: 0.2 % (ref 0–1.9)
BUN SERPL-MCNC: 6 MG/DL (ref 6–20)
CALCIUM SERPL-MCNC: 8.2 MG/DL (ref 8.7–10.5)
CHLORIDE SERPL-SCNC: 110 MMOL/L (ref 95–110)
CO2 SERPL-SCNC: 24 MMOL/L (ref 23–29)
CREAT SERPL-MCNC: 0.7 MG/DL (ref 0.5–1.4)
DIFFERENTIAL METHOD BLD: ABNORMAL
EOSINOPHIL # BLD AUTO: 0 K/UL (ref 0–0.5)
EOSINOPHIL NFR BLD: 0.5 % (ref 0–8)
ERYTHROCYTE [DISTWIDTH] IN BLOOD BY AUTOMATED COUNT: 18.6 % (ref 11.5–14.5)
EST. GFR  (NO RACE VARIABLE): >60 ML/MIN/1.73 M^2
GLUCOSE SERPL-MCNC: 97 MG/DL (ref 70–110)
HCT VFR BLD AUTO: 27.4 % (ref 37–48.5)
HGB BLD-MCNC: 7.4 G/DL (ref 12–16)
IMM GRANULOCYTES # BLD AUTO: 0.02 K/UL (ref 0–0.04)
IMM GRANULOCYTES NFR BLD AUTO: 0.3 % (ref 0–0.5)
LYMPHOCYTES # BLD AUTO: 0.8 K/UL (ref 1–4.8)
LYMPHOCYTES NFR BLD: 11.5 % (ref 18–48)
MAGNESIUM SERPL-MCNC: 1.9 MG/DL (ref 1.6–2.6)
MCH RBC QN AUTO: 19.3 PG (ref 27–31)
MCHC RBC AUTO-ENTMCNC: 27 G/DL (ref 32–36)
MCV RBC AUTO: 72 FL (ref 82–98)
MONOCYTES # BLD AUTO: 0.4 K/UL (ref 0.3–1)
MONOCYTES NFR BLD: 5.6 % (ref 4–15)
NEUTROPHILS # BLD AUTO: 5.4 K/UL (ref 1.8–7.7)
NEUTROPHILS NFR BLD: 81.9 % (ref 38–73)
NRBC BLD-RTO: 0 /100 WBC
OHS QRS DURATION: 96 MS
OHS QTC CALCULATION: 461 MS
PHOSPHATE SERPL-MCNC: 1.9 MG/DL (ref 2.7–4.5)
PLATELET # BLD AUTO: 215 K/UL (ref 150–450)
PMV BLD AUTO: ABNORMAL FL (ref 9.2–12.9)
POTASSIUM SERPL-SCNC: 3.4 MMOL/L (ref 3.5–5.1)
RBC # BLD AUTO: 3.83 M/UL (ref 4–5.4)
SODIUM SERPL-SCNC: 139 MMOL/L (ref 136–145)
WBC # BLD AUTO: 6.59 K/UL (ref 3.9–12.7)

## 2024-06-17 PROCEDURE — 63600175 PHARM REV CODE 636 W HCPCS: Performed by: HOSPITALIST

## 2024-06-17 PROCEDURE — 63600175 PHARM REV CODE 636 W HCPCS: Performed by: NURSE PRACTITIONER

## 2024-06-17 PROCEDURE — 96367 TX/PROPH/DG ADDL SEQ IV INF: CPT

## 2024-06-17 PROCEDURE — 96376 TX/PRO/DX INJ SAME DRUG ADON: CPT

## 2024-06-17 PROCEDURE — 94761 N-INVAS EAR/PLS OXIMETRY MLT: CPT

## 2024-06-17 PROCEDURE — 83735 ASSAY OF MAGNESIUM: CPT | Performed by: HOSPITALIST

## 2024-06-17 PROCEDURE — 80048 BASIC METABOLIC PNL TOTAL CA: CPT | Performed by: HOSPITALIST

## 2024-06-17 PROCEDURE — 36415 COLL VENOUS BLD VENIPUNCTURE: CPT | Performed by: HOSPITALIST

## 2024-06-17 PROCEDURE — 85025 COMPLETE CBC W/AUTO DIFF WBC: CPT | Performed by: HOSPITALIST

## 2024-06-17 PROCEDURE — 25000003 PHARM REV CODE 250: Performed by: HOSPITALIST

## 2024-06-17 PROCEDURE — G0378 HOSPITAL OBSERVATION PER HR: HCPCS

## 2024-06-17 PROCEDURE — 96366 THER/PROPH/DIAG IV INF ADDON: CPT

## 2024-06-17 PROCEDURE — 99213 OFFICE O/P EST LOW 20 MIN: CPT | Mod: ,,, | Performed by: STUDENT IN AN ORGANIZED HEALTH CARE EDUCATION/TRAINING PROGRAM

## 2024-06-17 PROCEDURE — 84100 ASSAY OF PHOSPHORUS: CPT | Performed by: HOSPITALIST

## 2024-06-17 RX ORDER — ACETAMINOPHEN 500 MG
1000 TABLET ORAL EVERY 8 HOURS PRN
Status: DISCONTINUED | OUTPATIENT
Start: 2024-06-17 | End: 2024-06-19 | Stop reason: HOSPADM

## 2024-06-17 RX ORDER — NIFEDIPINE 30 MG/1
30 TABLET, EXTENDED RELEASE ORAL DAILY
Status: DISCONTINUED | OUTPATIENT
Start: 2024-06-17 | End: 2024-06-19 | Stop reason: HOSPADM

## 2024-06-17 RX ORDER — HYDROMORPHONE HYDROCHLORIDE 1 MG/ML
1 INJECTION, SOLUTION INTRAMUSCULAR; INTRAVENOUS; SUBCUTANEOUS EVERY 4 HOURS PRN
Status: DISCONTINUED | OUTPATIENT
Start: 2024-06-17 | End: 2024-06-18

## 2024-06-17 RX ADMIN — SODIUM CHLORIDE AND POTASSIUM CHLORIDE: 9; 2.98 INJECTION, SOLUTION INTRAVENOUS at 10:06

## 2024-06-17 RX ADMIN — PIPERACILLIN SODIUM AND TAZOBACTAM SODIUM 4.5 G: 4; .5 INJECTION, POWDER, LYOPHILIZED, FOR SOLUTION INTRAVENOUS at 03:06

## 2024-06-17 RX ADMIN — POTASSIUM CHLORIDE: 149 INJECTION, SOLUTION, CONCENTRATE INTRAVENOUS at 01:06

## 2024-06-17 RX ADMIN — PIPERACILLIN SODIUM AND TAZOBACTAM SODIUM 4.5 G: 4; .5 INJECTION, POWDER, LYOPHILIZED, FOR SOLUTION INTRAVENOUS at 12:06

## 2024-06-17 RX ADMIN — ACETAMINOPHEN 1000 MG: 500 TABLET ORAL at 12:06

## 2024-06-17 RX ADMIN — HYDROMORPHONE HYDROCHLORIDE 0.5 MG: 0.5 INJECTION, SOLUTION INTRAMUSCULAR; INTRAVENOUS; SUBCUTANEOUS at 08:06

## 2024-06-17 RX ADMIN — HYDROMORPHONE HYDROCHLORIDE 0.5 MG: 0.5 INJECTION, SOLUTION INTRAMUSCULAR; INTRAVENOUS; SUBCUTANEOUS at 06:06

## 2024-06-17 RX ADMIN — PIPERACILLIN SODIUM AND TAZOBACTAM SODIUM 4.5 G: 4; .5 INJECTION, POWDER, LYOPHILIZED, FOR SOLUTION INTRAVENOUS at 09:06

## 2024-06-17 RX ADMIN — HYDROMORPHONE HYDROCHLORIDE 0.5 MG: 0.5 INJECTION, SOLUTION INTRAMUSCULAR; INTRAVENOUS; SUBCUTANEOUS at 04:06

## 2024-06-17 RX ADMIN — HYDROMORPHONE HYDROCHLORIDE 0.5 MG: 0.5 INJECTION, SOLUTION INTRAMUSCULAR; INTRAVENOUS; SUBCUTANEOUS at 02:06

## 2024-06-17 RX ADMIN — HYDROMORPHONE HYDROCHLORIDE 1 MG: 1 INJECTION, SOLUTION INTRAMUSCULAR; INTRAVENOUS; SUBCUTANEOUS at 10:06

## 2024-06-17 NOTE — SUBJECTIVE & OBJECTIVE
Interval History:  Abdominal pain manageable.  Denies any further nausea or vomiting.      Review of Systems   Constitutional:  Negative for chills and fever.   Respiratory:  Negative for shortness of breath.    Cardiovascular:  Negative for chest pain.   Gastrointestinal:  Positive for abdominal pain. Negative for nausea and vomiting.   Genitourinary:  Negative for dysuria and frequency.     Objective:     Vital Signs (Most Recent):  Temp: 98.2 °F (36.8 °C) (06/17/24 0403)  Pulse: 73 (06/17/24 0403)  Resp: 18 (06/17/24 0408)  BP: (!) 156/90 (06/17/24 0403)  SpO2: 99 % (06/17/24 0403) Vital Signs (24h Range):  Temp:  [97.3 °F (36.3 °C)-99.1 °F (37.3 °C)] 98.2 °F (36.8 °C)  Pulse:  [63-81] 73  Resp:  [13-20] 18  SpO2:  [97 %-100 %] 99 %  BP: (140-168)/(86-98) 156/90     Weight: 73.9 kg (162 lb 14.7 oz)  Body mass index is 28.86 kg/m².    Intake/Output Summary (Last 24 hours) at 6/17/2024 0811  Last data filed at 6/17/2024 0619  Gross per 24 hour   Intake 1898.12 ml   Output --   Net 1898.12 ml         Physical Exam  Constitutional:       General: She is not in acute distress.  HENT:      Head: Atraumatic.   Eyes:      Conjunctiva/sclera: Conjunctivae normal.   Cardiovascular:      Rate and Rhythm: Normal rate and regular rhythm.      Heart sounds: Normal heart sounds. No murmur heard.  Pulmonary:      Effort: Pulmonary effort is normal.      Breath sounds: Normal breath sounds. No wheezing.   Abdominal:      General: Bowel sounds are normal. There is no distension.      Palpations: Abdomen is soft.      Tenderness: There is abdominal tenderness.   Musculoskeletal:         General: No deformity. Normal range of motion.      Cervical back: Neck supple.   Neurological:      Mental Status: She is alert and oriented to person, place, and time.             Significant Labs: All pertinent labs within the past 24 hours have been reviewed.    Significant Imaging: I have reviewed all pertinent imaging results/findings within  the past 24 hours.

## 2024-06-17 NOTE — ASSESSMENT & PLAN NOTE
Continue to hold diuretic therapy.  Serum potassium improving with replacement.  Continue isotonic fluid with serum potassium.  Repeat labs tomorrow morning.

## 2024-06-17 NOTE — ASSESSMENT & PLAN NOTE
Chronic and at around baseline.  Mild drop due to likely being hemoconcentrated on presentation.  Blood consent obtained.  Denies significant symptoms of anemia at this time.  Monitor.  Transfuse if hemoglobin less than 7 grams/deciliter.  We will need to restart oral iron on discharge.  Patient advised to undergo partial hysterectomy previously but declined. Follow up with Gynecology on discharge to reconsider definitive treat.

## 2024-06-17 NOTE — HOSPITAL COURSE
Patient is a 44 year woman with history of hypertension, dyslipidemia, history of menorrhagia and uterine leiomyoma with chronic blood loss resulting in iron-deficiency anemia admitted with evidence of acute appendicitis.  Patient treated intravenous antibiotics.  General surgery consult offered surgery versus nonoperative management with antibiotic therapy.  Patient elected for antibiotic therapy thus far.  White count improving.

## 2024-06-17 NOTE — PLAN OF CARE
Case Management Assessment     PCP: Trista Szymanski at [facility]  Pharmacy: Bedside     Patient Arrived From: Home   Existing Help at Home: Family     Barriers to Discharge: None     Discharge Plan:    A. Home with family    B. Home with family    06/17/24 0942   Discharge Assessment   Assessment Type Discharge Planning Assessment   Confirmed/corrected address, phone number and insurance Yes   Confirmed Demographics Correct on Facesheet   Source of Information patient;family;health record   People in Home child(paty), dependent   Do you expect to return to your current living situation? Yes   Do you have help at home or someone to help you manage your care at home? Yes   Who are your caregiver(s) and their phone number(s)? Kids   Prior to hospitilization cognitive status: Alert/Oriented   Current cognitive status: Alert/Oriented   Walking or Climbing Stairs Difficulty no   Dressing/Bathing Difficulty no   Equipment Currently Used at Home none   Readmission within 30 days? No   Patient currently being followed by outpatient case management? No   Do you currently have service(s) that help you manage your care at home? No   Do you take prescription medications? Yes   Do you have prescription coverage? Yes   Do you have any problems affording any of your prescribed medications? No   Is the patient taking medications as prescribed? yes   How do you get to doctors appointments? car, drives self;family or friend will provide   Are you on dialysis? No   Do you take coumadin? No   Discharge Plan A Home   Discharge Plan B Home with family   DME Needed Upon Discharge  none   Discharge Plan discussed with: Patient   Transition of Care Barriers None   Physical Activity   On average, how many days per week do you engage in moderate to strenuous exercise (like a brisk walk)? 0 days   On average, how many minutes do you engage in exercise at this level? 0 min   Financial Resource Strain   How hard is it for you to  pay for the very basics like food, housing, medical care, and heating? Not hard   Housing Stability   In the last 12 months, was there a time when you were not able to pay the mortgage or rent on time? N   At any time in the past 12 months, were you homeless or living in a shelter (including now)? N   Transportation Needs   Has the lack of transportation kept you from medical appointments, meetings, work or from getting things needed for daily living? No   Food Insecurity   Within the past 12 months, you worried that your food would run out before you got the money to buy more. Never true   Within the past 12 months, the food you bought just didn't last and you didn't have money to get more. Never true   Stress   Do you feel stress - tense, restless, nervous, or anxious, or unable to sleep at night because your mind is troubled all the time - these days? Only a littl   Social Isolation   How often do you feel lonely or isolated from those around you?  Never   Alcohol Use   Q1: How often do you have a drink containing alcohol? Monthly or l   Q2: How many drinks containing alcohol do you have on a typical day when you are drinking? None   Q3: How often do you have six or more drinks on one occasion? Less than mo   Health Literacy   How often do you need to have someone help you when you read instructions, pamphlets, or other written material from your doctor or pharmacy? Never

## 2024-06-17 NOTE — ASSESSMENT & PLAN NOTE
Patient presents with acute onset of nausea, vomiting, and abdominal pain.  Urine pregnancy test negative.  CT scan finding as showing evidence of acute appendicitis. General surgery consult offered surgery versus nonoperative management.  Patient elected for nonoperative management thus far.  Appears to be clinically improving with intravenous antibiotics.  Keep NPO following further conversation with General Surgery.  If surgery not pursued with attempt to start oral diet today.  If able to tolerate oral intake today potentially can go home tomorrow on oral antibiotics.

## 2024-06-17 NOTE — PLAN OF CARE
Problem: Adult Inpatient Plan of Care  Goal: Plan of Care Review  Outcome: Progressing  Goal: Patient-Specific Goal (Individualized)  Outcome: Progressing  Goal: Absence of Hospital-Acquired Illness or Injury  Outcome: Progressing  Goal: Optimal Comfort and Wellbeing  Outcome: Progressing   POC reviewed with pt. No acute changes. Pt tolerates diet. Fluids infusing per orders. Safety checks performed.

## 2024-06-17 NOTE — PROGRESS NOTES
[facility] [address] Medicine  Progress Note    Patient Name: Amina Vazquez  MRN: [id number]  Patient Class: OP- Observation   Admission Date: 2024  Length of Stay: 0 days  Attending Physician: Jeremiah Hendrickson MD        Subjective:     Principal Problem:Acute appendicitis with localized peritonitis, without perforation, abscess, or gangrene        HPI:  Patient is a 44 year woman with history of hypertension, dyslipidemia, history of menorrhagia and uterine leiomyoma with chronic blood loss resulting in iron-deficiency anemia who presents to the emergency department with nausea, vomiting, and abdominal pain.  Patient reports she has been having some intermittent abdominal discomfort but markedly worse since 5 am this morning.  Denies any diarrhea.  Patient reports prior  sections but otherwise no other prior surgeries.  Patient reports she took at Tramadol 50 mg by mouth this morning due severity of the pain with some improvement.    Denies any tobacco use, alcohol use, or illicit drug.  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  No sick contacts.  No recent travel.    CT scan shows evidence of acute appendicitis.    Overview/Hospital Course:  Patient is a 44 year woman with history of hypertension, dyslipidemia, history of menorrhagia and uterine leiomyoma with chronic blood loss resulting in iron-deficiency anemia admitted with evidence of acute appendicitis.  Patient treated intravenous antibiotics.  General surgery consult offered surgery versus nonoperative management with antibiotic therapy.  Patient elected for antibiotic therapy thus far.  White count improving.    Interval History:  Abdominal pain manageable.  Denies any further nausea or vomiting.      Review of Systems   Constitutional:  Negative for chills and fever.   Respiratory:  Negative for shortness of breath.    Cardiovascular:  Negative for chest pain.   Gastrointestinal:  Positive for  abdominal pain. Negative for nausea and vomiting.   Genitourinary:  Negative for dysuria and frequency.     Objective:     Vital Signs (Most Recent):  Temp: 98.2 °F (36.8 °C) (06/17/24 0403)  Pulse: 73 (06/17/24 0403)  Resp: 18 (06/17/24 0408)  BP: (!) 156/90 (06/17/24 0403)  SpO2: 99 % (06/17/24 0403) Vital Signs (24h Range):  Temp:  [97.3 °F (36.3 °C)-99.1 °F (37.3 °C)] 98.2 °F (36.8 °C)  Pulse:  [63-81] 73  Resp:  [13-20] 18  SpO2:  [97 %-100 %] 99 %  BP: (140-168)/(86-98) 156/90     Weight: 73.9 kg (162 lb 14.7 oz)  Body mass index is 28.86 kg/m².    Intake/Output Summary (Last 24 hours) at 6/17/2024 0811  Last data filed at 6/17/2024 0619  Gross per 24 hour   Intake 1898.12 ml   Output --   Net 1898.12 ml         Physical Exam  Constitutional:       General: She is not in acute distress.  HENT:      Head: Atraumatic.   Eyes:      Conjunctiva/sclera: Conjunctivae normal.   Cardiovascular:      Rate and Rhythm: Normal rate and regular rhythm.      Heart sounds: Normal heart sounds. No murmur heard.  Pulmonary:      Effort: Pulmonary effort is normal.      Breath sounds: Normal breath sounds. No wheezing.   Abdominal:      General: Bowel sounds are normal. There is no distension.      Palpations: Abdomen is soft.      Tenderness: There is abdominal tenderness.   Musculoskeletal:         General: No deformity. Normal range of motion.      Cervical back: Neck supple.   Neurological:      Mental Status: She is alert and oriented to person, place, and time.             Significant Labs: All pertinent labs within the past 24 hours have been reviewed.    Significant Imaging: I have reviewed all pertinent imaging results/findings within the past 24 hours.    Assessment/Plan:      * Acute appendicitis with localized peritonitis, without perforation, abscess, or gangrene  Patient presents with acute onset of nausea, vomiting, and abdominal pain.  Urine pregnancy test negative.  CT scan finding as showing evidence of acute  appendicitis. General surgery consult offered surgery versus nonoperative management.  Patient elected for nonoperative management thus far.  Appears to be clinically improving with intravenous antibiotics.  Keep NPO following further conversation with General Surgery.  If surgery not pursued with attempt to start oral diet today.  If able to tolerate oral intake today potentially can go home tomorrow on oral antibiotics.    Hypertension  Discontinue thiazide diuretic due to hypokalemia.  Start calcium channel blocker today.    Hypokalemia  Continue to hold diuretic therapy.  Serum potassium improving with replacement.  Continue isotonic fluid with serum potassium.  Repeat labs tomorrow morning.    Iron deficiency anemia due to chronic blood loss  Chronic and at around baseline.  Mild drop due to likely being hemoconcentrated on presentation.  Blood consent obtained.  Denies significant symptoms of anemia at this time.  Monitor.  Transfuse if hemoglobin less than 7 grams/deciliter.  We will need to restart oral iron on discharge.  Patient advised to undergo partial hysterectomy previously but declined. Follow up with Gynecology on discharge to reconsider definitive treat.      VTE Risk Mitigation (From admission, onward)           Ordered     IP VTE LOW RISK PATIENT  Once         06/16/24 1611     Place sequential compression device  Until discontinued         06/16/24 1611     Place GALLO hose  Until discontinued         06/16/24 1518                    Jeremiah Hendrickson MD  Department of Hospital Medicine   [facility] S[facility] ([address])

## 2024-06-18 LAB
ANION GAP SERPL CALC-SCNC: 4 MMOL/L (ref 8–16)
BASOPHILS # BLD AUTO: 0.03 K/UL (ref 0–0.2)
BASOPHILS # BLD AUTO: 0.04 K/UL (ref 0–0.2)
BASOPHILS NFR BLD: 0.8 % (ref 0–1.9)
BASOPHILS NFR BLD: 1.1 % (ref 0–1.9)
BUN SERPL-MCNC: 7 MG/DL (ref 6–20)
CALCIUM SERPL-MCNC: 7.5 MG/DL (ref 8.7–10.5)
CHLORIDE SERPL-SCNC: 113 MMOL/L (ref 95–110)
CO2 SERPL-SCNC: 22 MMOL/L (ref 23–29)
CREAT SERPL-MCNC: 0.6 MG/DL (ref 0.5–1.4)
DIFFERENTIAL METHOD BLD: ABNORMAL
DIFFERENTIAL METHOD BLD: ABNORMAL
EOSINOPHIL # BLD AUTO: 0.1 K/UL (ref 0–0.5)
EOSINOPHIL # BLD AUTO: 0.1 K/UL (ref 0–0.5)
EOSINOPHIL NFR BLD: 1.8 % (ref 0–8)
EOSINOPHIL NFR BLD: 2.1 % (ref 0–8)
ERYTHROCYTE [DISTWIDTH] IN BLOOD BY AUTOMATED COUNT: 18.6 % (ref 11.5–14.5)
ERYTHROCYTE [DISTWIDTH] IN BLOOD BY AUTOMATED COUNT: 18.6 % (ref 11.5–14.5)
EST. GFR  (NO RACE VARIABLE): >60 ML/MIN/1.73 M^2
GLUCOSE SERPL-MCNC: 93 MG/DL (ref 70–110)
HCT VFR BLD AUTO: 24.5 % (ref 37–48.5)
HCT VFR BLD AUTO: 26.9 % (ref 37–48.5)
HGB BLD-MCNC: 6.5 G/DL (ref 12–16)
HGB BLD-MCNC: 7.2 G/DL (ref 12–16)
IMM GRANULOCYTES # BLD AUTO: 0 K/UL (ref 0–0.04)
IMM GRANULOCYTES # BLD AUTO: 0.01 K/UL (ref 0–0.04)
IMM GRANULOCYTES NFR BLD AUTO: 0 % (ref 0–0.5)
IMM GRANULOCYTES NFR BLD AUTO: 0.3 % (ref 0–0.5)
LYMPHOCYTES # BLD AUTO: 1 K/UL (ref 1–4.8)
LYMPHOCYTES # BLD AUTO: 1 K/UL (ref 1–4.8)
LYMPHOCYTES NFR BLD: 24.6 % (ref 18–48)
LYMPHOCYTES NFR BLD: 25.9 % (ref 18–48)
MAGNESIUM SERPL-MCNC: 1.8 MG/DL (ref 1.6–2.6)
MCH RBC QN AUTO: 19.5 PG (ref 27–31)
MCH RBC QN AUTO: 19.5 PG (ref 27–31)
MCHC RBC AUTO-ENTMCNC: 26.5 G/DL (ref 32–36)
MCHC RBC AUTO-ENTMCNC: 26.8 G/DL (ref 32–36)
MCV RBC AUTO: 73 FL (ref 82–98)
MCV RBC AUTO: 73 FL (ref 82–98)
MONOCYTES # BLD AUTO: 0.4 K/UL (ref 0.3–1)
MONOCYTES # BLD AUTO: 0.4 K/UL (ref 0.3–1)
MONOCYTES NFR BLD: 9.3 % (ref 4–15)
MONOCYTES NFR BLD: 9.4 % (ref 4–15)
NEUTROPHILS # BLD AUTO: 2.3 K/UL (ref 1.8–7.7)
NEUTROPHILS # BLD AUTO: 2.5 K/UL (ref 1.8–7.7)
NEUTROPHILS NFR BLD: 61.6 % (ref 38–73)
NEUTROPHILS NFR BLD: 63.1 % (ref 38–73)
NRBC BLD-RTO: 0 /100 WBC
NRBC BLD-RTO: 0 /100 WBC
PHOSPHATE SERPL-MCNC: 2 MG/DL (ref 2.7–4.5)
PLATELET # BLD AUTO: 183 K/UL (ref 150–450)
PLATELET # BLD AUTO: 203 K/UL (ref 150–450)
PMV BLD AUTO: 10.2 FL (ref 9.2–12.9)
PMV BLD AUTO: ABNORMAL FL (ref 9.2–12.9)
POTASSIUM SERPL-SCNC: 3.8 MMOL/L (ref 3.5–5.1)
RBC # BLD AUTO: 3.34 M/UL (ref 4–5.4)
RBC # BLD AUTO: 3.69 M/UL (ref 4–5.4)
SODIUM SERPL-SCNC: 139 MMOL/L (ref 136–145)
WBC # BLD AUTO: 3.78 K/UL (ref 3.9–12.7)
WBC # BLD AUTO: 3.94 K/UL (ref 3.9–12.7)

## 2024-06-18 PROCEDURE — 85025 COMPLETE CBC W/AUTO DIFF WBC: CPT | Performed by: HOSPITALIST

## 2024-06-18 PROCEDURE — 94761 N-INVAS EAR/PLS OXIMETRY MLT: CPT

## 2024-06-18 PROCEDURE — 83735 ASSAY OF MAGNESIUM: CPT | Performed by: HOSPITALIST

## 2024-06-18 PROCEDURE — 85025 COMPLETE CBC W/AUTO DIFF WBC: CPT | Mod: 91 | Performed by: HOSPITALIST

## 2024-06-18 PROCEDURE — G0378 HOSPITAL OBSERVATION PER HR: HCPCS

## 2024-06-18 PROCEDURE — 36415 COLL VENOUS BLD VENIPUNCTURE: CPT | Performed by: HOSPITALIST

## 2024-06-18 PROCEDURE — 99213 OFFICE O/P EST LOW 20 MIN: CPT | Mod: ,,, | Performed by: STUDENT IN AN ORGANIZED HEALTH CARE EDUCATION/TRAINING PROGRAM

## 2024-06-18 PROCEDURE — 96365 THER/PROPH/DIAG IV INF INIT: CPT | Mod: 59

## 2024-06-18 PROCEDURE — 96361 HYDRATE IV INFUSION ADD-ON: CPT

## 2024-06-18 PROCEDURE — 96366 THER/PROPH/DIAG IV INF ADDON: CPT

## 2024-06-18 PROCEDURE — 80048 BASIC METABOLIC PNL TOTAL CA: CPT | Performed by: HOSPITALIST

## 2024-06-18 PROCEDURE — 25000003 PHARM REV CODE 250: Performed by: HOSPITALIST

## 2024-06-18 PROCEDURE — 96376 TX/PRO/DX INJ SAME DRUG ADON: CPT

## 2024-06-18 PROCEDURE — 84100 ASSAY OF PHOSPHORUS: CPT | Performed by: HOSPITALIST

## 2024-06-18 PROCEDURE — 63600175 PHARM REV CODE 636 W HCPCS: Performed by: HOSPITALIST

## 2024-06-18 PROCEDURE — 63600175 PHARM REV CODE 636 W HCPCS: Performed by: NURSE PRACTITIONER

## 2024-06-18 RX ORDER — HYDROMORPHONE HYDROCHLORIDE 1 MG/ML
0.5 INJECTION, SOLUTION INTRAMUSCULAR; INTRAVENOUS; SUBCUTANEOUS EVERY 4 HOURS PRN
Status: DISCONTINUED | OUTPATIENT
Start: 2024-06-18 | End: 2024-06-19 | Stop reason: HOSPADM

## 2024-06-18 RX ORDER — HYDROMORPHONE HYDROCHLORIDE 1 MG/ML
0.5 INJECTION, SOLUTION INTRAMUSCULAR; INTRAVENOUS; SUBCUTANEOUS EVERY 4 HOURS PRN
Status: DISCONTINUED | OUTPATIENT
Start: 2024-06-18 | End: 2024-06-18

## 2024-06-18 RX ORDER — OXYCODONE AND ACETAMINOPHEN 5; 325 MG/1; MG/1
1 TABLET ORAL EVERY 4 HOURS PRN
Status: DISCONTINUED | OUTPATIENT
Start: 2024-06-18 | End: 2024-06-19 | Stop reason: HOSPADM

## 2024-06-18 RX ADMIN — PIPERACILLIN SODIUM AND TAZOBACTAM SODIUM 4.5 G: 4; .5 INJECTION, POWDER, LYOPHILIZED, FOR SOLUTION INTRAVENOUS at 03:06

## 2024-06-18 RX ADMIN — PIPERACILLIN SODIUM AND TAZOBACTAM SODIUM 4.5 G: 4; .5 INJECTION, POWDER, LYOPHILIZED, FOR SOLUTION INTRAVENOUS at 09:06

## 2024-06-18 RX ADMIN — HYDROMORPHONE HYDROCHLORIDE 0.5 MG: 0.5 INJECTION, SOLUTION INTRAMUSCULAR; INTRAVENOUS; SUBCUTANEOUS at 08:06

## 2024-06-18 RX ADMIN — NIFEDIPINE 30 MG: 30 TABLET, FILM COATED, EXTENDED RELEASE ORAL at 08:06

## 2024-06-18 RX ADMIN — ONDANSETRON 8 MG: 2 INJECTION INTRAMUSCULAR; INTRAVENOUS at 08:06

## 2024-06-18 RX ADMIN — OXYCODONE HYDROCHLORIDE AND ACETAMINOPHEN 1 TABLET: 5; 325 TABLET ORAL at 08:06

## 2024-06-18 RX ADMIN — OXYCODONE HYDROCHLORIDE AND ACETAMINOPHEN 1 TABLET: 5; 325 TABLET ORAL at 12:06

## 2024-06-18 RX ADMIN — PIPERACILLIN SODIUM AND TAZOBACTAM SODIUM 4.5 G: 4; .5 INJECTION, POWDER, LYOPHILIZED, FOR SOLUTION INTRAVENOUS at 12:06

## 2024-06-18 RX ADMIN — SODIUM CHLORIDE AND POTASSIUM CHLORIDE: 9; 2.98 INJECTION, SOLUTION INTRAVENOUS at 05:06

## 2024-06-18 RX ADMIN — HYDROMORPHONE HYDROCHLORIDE 1 MG: 1 INJECTION, SOLUTION INTRAMUSCULAR; INTRAVENOUS; SUBCUTANEOUS at 03:06

## 2024-06-18 NOTE — ASSESSMENT & PLAN NOTE
-Noted chronic iron deficiency anemia likely due to menorrhagia.  -Hb 8.5 on admit and down to 6.5 this morning  -Consented for blood transfusion today  -Repeat cbc and if Hb less than 7 will give 1 unit prbc  -We will need to restart oral iron on discharge.    -Patient advised to undergo partial hysterectomy previously but declined. Follow up with Gynecology on discharge to reconsider definitive treat.

## 2024-06-18 NOTE — PLAN OF CARE
Problem: Adult Inpatient Plan of Care  Goal: Plan of Care Review  Outcome: Progressing  Goal: Patient-Specific Goal (Individualized)  Outcome: Progressing  Goal: Absence of Hospital-Acquired Illness or Injury  Outcome: Progressing  Goal: Optimal Comfort and Wellbeing  Outcome: Progressing   POC reviewed with pt. A&Ox.4 Room air. No acute changes. PRN medication administered for pain. Tolerating diet. Safety checks performed.

## 2024-06-18 NOTE — PROGRESS NOTES
[facility] [address] Medicine  Progress Note    Patient Name: Amina Vazquez  MRN: [id number]  Patient Class: OP- Observation   Admission Date: 2024  Length of Stay: 0 days  Attending Physician: Rafal Weinstein MD  Primary Care Provider: Solange, Primary Doctor        Subjective:     Principal Problem:Acute appendicitis with localized peritonitis, without perforation, abscess, or gangrene        HPI:  Patient is a 44 year woman with history of hypertension, dyslipidemia, history of menorrhagia and uterine leiomyoma with chronic blood loss resulting in iron-deficiency anemia who presents to the emergency department with nausea, vomiting, and abdominal pain.  Patient reports she has been having some intermittent abdominal discomfort but markedly worse since 5 am this morning.  Denies any diarrhea.  Patient reports prior  sections but otherwise no other prior surgeries.  Patient reports she took at Tramadol 50 mg by mouth this morning due severity of the pain with some improvement.    Denies any tobacco use, alcohol use, or illicit drug.  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  No sick contacts.  No recent travel.    CT scan shows evidence of acute appendicitis.    Overview/Hospital Course:  Patient is a 44 year woman with history of hypertension, dyslipidemia, history of menorrhagia and uterine leiomyoma with chronic blood loss resulting in iron-deficiency anemia admitted with evidence of acute appendicitis.  Patient treated intravenous antibiotics.  General surgery consult offered surgery versus nonoperative management with antibiotic therapy.  Patient elected for antibiotic therapy thus far.  White count improving.    Interval History:  No acute events overnight.  States she is feeling much better overall.  Still with some abdominal discomfort but improving.  Denies further nausea and vomiting.  Does not wish for surgical intervention.  Reported very heavy menstrual  flow.  All questions answered and patient had no further complaints.    Objective:     Vital Signs (Most Recent):  Temp: 97.3 °F (36.3 °C) (06/18/24 0755)  Pulse: (!) 59 (06/18/24 0755)  Resp: 16 (06/18/24 0839)  BP: (!) 145/87 (06/18/24 0755)  SpO2: 99 % (06/18/24 0755) Vital Signs (24h Range):  Temp:  [97.2 °F (36.2 °C)-97.7 °F (36.5 °C)] 97.3 °F (36.3 °C)  Pulse:  [54-80] 59  Resp:  [12-20] 16  SpO2:  [96 %-100 %] 99 %  BP: (130-164)/(84-95) 145/87     Weight: 73.9 kg (162 lb 14.7 oz)  Body mass index is 28.86 kg/m².    Intake/Output Summary (Last 24 hours) at 6/18/2024 1032  Last data filed at 6/18/2024 0627  Gross per 24 hour   Intake 3004.59 ml   Output --   Net 3004.59 ml         Physical Exam  Vitals and nursing note reviewed.   Constitutional:       General: She is not in acute distress.     Appearance: She is ill-appearing. She is not toxic-appearing or diaphoretic.   HENT:      Head: Atraumatic.   Eyes:      Extraocular Movements: Extraocular movements intact.   Cardiovascular:      Rate and Rhythm: Regular rhythm. Bradycardia present.      Heart sounds: Normal heart sounds. No murmur heard.  Pulmonary:      Effort: Pulmonary effort is normal.      Breath sounds: Normal breath sounds. No wheezing.   Abdominal:      General: Bowel sounds are normal. There is no distension.      Palpations: Abdomen is soft.      Tenderness: There is abdominal tenderness.   Musculoskeletal:         General: No deformity. Normal range of motion.      Cervical back: Normal range of motion.   Neurological:      Mental Status: She is alert and oriented to person, place, and time.             Significant Labs: All pertinent labs within the past 24 hours have been reviewed.    Significant Imaging: I have reviewed all pertinent imaging results/findings within the past 24 hours.    Assessment/Plan:      * Acute appendicitis with localized peritonitis, without perforation, abscess, or gangrene  Patient presents with acute onset of  nausea, vomiting, and abdominal pain.  Urine pregnancy test negative.  CT scan showed evidence of acute appendicitis. General surgery consulted and offered surgery versus nonoperative management.  Patient elected for nonoperative management.  Clinically improving with antibiotics and remains afebrile with improved pain.  Hopefully tolerates diet and go home in next day or two.    Iron deficiency anemia due to chronic blood loss  -Noted chronic iron deficiency anemia likely due to menorrhagia.  -Hb 8.5 on admit and down to 6.5 this morning  -Consented for blood transfusion today  -Repeat cbc and if Hb less than 7 will give 1 unit prbc  -We will need to restart oral iron on discharge.    -Patient advised to undergo partial hysterectomy previously but declined. Follow up with Gynecology on discharge to reconsider definitive treat.    Hypokalemia  -K normal today  -Repeat labs in AM    Hypertension  -BP normal to mildly elevated  -HCTZ stopped due to hypokalemia  -Continue nifedipine      VTE Risk Mitigation (From admission, onward)           Ordered     IP VTE LOW RISK PATIENT  Once         06/16/24 1611     Place sequential compression device  Until discontinued         06/16/24 1611     Place GALLO hose  Until discontinued         06/16/24 1518                    Discharge Planning   RUDI:      Code Status: Full Code   Is the patient medically ready for discharge?:     Reason for patient still in hospital (select all that apply): Treatment  Discharge Plan A: Home                  Rafal Weinstein MD  Department of Hospital Medicine   [facility] ([address])

## 2024-06-18 NOTE — SUBJECTIVE & OBJECTIVE
Interval History:  No acute events overnight.  States she is feeling much better overall.  Still with some abdominal discomfort but improving.  Denies further nausea and vomiting.  Does not wish for surgical intervention.  Reported very heavy menstrual flow.  All questions answered and patient had no further complaints.    Objective:     Vital Signs (Most Recent):  Temp: 97.3 °F (36.3 °C) (06/18/24 0755)  Pulse: (!) 59 (06/18/24 0755)  Resp: 16 (06/18/24 0839)  BP: (!) 145/87 (06/18/24 0755)  SpO2: 99 % (06/18/24 0755) Vital Signs (24h Range):  Temp:  [97.2 °F (36.2 °C)-97.7 °F (36.5 °C)] 97.3 °F (36.3 °C)  Pulse:  [54-80] 59  Resp:  [12-20] 16  SpO2:  [96 %-100 %] 99 %  BP: (130-164)/(84-95) 145/87     Weight: 73.9 kg (162 lb 14.7 oz)  Body mass index is 28.86 kg/m².    Intake/Output Summary (Last 24 hours) at 6/18/2024 1032  Last data filed at 6/18/2024 0627  Gross per 24 hour   Intake 3004.59 ml   Output --   Net 3004.59 ml         Physical Exam  Vitals and nursing note reviewed.   Constitutional:       General: She is not in acute distress.     Appearance: She is ill-appearing. She is not toxic-appearing or diaphoretic.   HENT:      Head: Atraumatic.   Eyes:      Extraocular Movements: Extraocular movements intact.   Cardiovascular:      Rate and Rhythm: Regular rhythm. Bradycardia present.      Heart sounds: Normal heart sounds. No murmur heard.  Pulmonary:      Effort: Pulmonary effort is normal.      Breath sounds: Normal breath sounds. No wheezing.   Abdominal:      General: Bowel sounds are normal. There is no distension.      Palpations: Abdomen is soft.      Tenderness: There is abdominal tenderness.   Musculoskeletal:         General: No deformity. Normal range of motion.      Cervical back: Normal range of motion.   Neurological:      Mental Status: She is alert and oriented to person, place, and time.             Significant Labs: All pertinent labs within the past 24 hours have been  reviewed.    Significant Imaging: I have reviewed all pertinent imaging results/findings within the past 24 hours.

## 2024-06-18 NOTE — ASSESSMENT & PLAN NOTE
Patient presents with acute onset of nausea, vomiting, and abdominal pain.  Urine pregnancy test negative.  CT scan showed evidence of acute appendicitis. General surgery consulted and offered surgery versus nonoperative management.  Patient elected for nonoperative management.  Clinically improving with antibiotics and remains afebrile with improved pain.  Hopefully tolerates diet and go home in next day or two.

## 2024-06-19 VITALS
TEMPERATURE: 98 F | OXYGEN SATURATION: 99 % | BODY MASS INDEX: 28.87 KG/M2 | WEIGHT: 162.94 LBS | DIASTOLIC BLOOD PRESSURE: 72 MMHG | RESPIRATION RATE: 16 BRPM | SYSTOLIC BLOOD PRESSURE: 112 MMHG | HEIGHT: 63 IN | HEART RATE: 58 BPM

## 2024-06-19 LAB
ANION GAP SERPL CALC-SCNC: 10 MMOL/L (ref 8–16)
BASOPHILS # BLD AUTO: 0.04 K/UL (ref 0–0.2)
BASOPHILS NFR BLD: 0.8 % (ref 0–1.9)
BUN SERPL-MCNC: 5 MG/DL (ref 6–20)
CALCIUM SERPL-MCNC: 8.7 MG/DL (ref 8.7–10.5)
CHLORIDE SERPL-SCNC: 107 MMOL/L (ref 95–110)
CO2 SERPL-SCNC: 23 MMOL/L (ref 23–29)
CREAT SERPL-MCNC: 0.7 MG/DL (ref 0.5–1.4)
DIFFERENTIAL METHOD BLD: ABNORMAL
EOSINOPHIL # BLD AUTO: 0.1 K/UL (ref 0–0.5)
EOSINOPHIL NFR BLD: 2.3 % (ref 0–8)
ERYTHROCYTE [DISTWIDTH] IN BLOOD BY AUTOMATED COUNT: 18.6 % (ref 11.5–14.5)
EST. GFR  (NO RACE VARIABLE): >60 ML/MIN/1.73 M^2
GLUCOSE SERPL-MCNC: 99 MG/DL (ref 70–110)
HCT VFR BLD AUTO: 27.9 % (ref 37–48.5)
HGB BLD-MCNC: 7.7 G/DL (ref 12–16)
IMM GRANULOCYTES # BLD AUTO: 0.01 K/UL (ref 0–0.04)
IMM GRANULOCYTES NFR BLD AUTO: 0.2 % (ref 0–0.5)
LYMPHOCYTES # BLD AUTO: 0.9 K/UL (ref 1–4.8)
LYMPHOCYTES NFR BLD: 16.8 % (ref 18–48)
MAGNESIUM SERPL-MCNC: 1.8 MG/DL (ref 1.6–2.6)
MCH RBC QN AUTO: 19.5 PG (ref 27–31)
MCHC RBC AUTO-ENTMCNC: 27.6 G/DL (ref 32–36)
MCV RBC AUTO: 71 FL (ref 82–98)
MONOCYTES # BLD AUTO: 0.4 K/UL (ref 0.3–1)
MONOCYTES NFR BLD: 7.5 % (ref 4–15)
NEUTROPHILS # BLD AUTO: 3.8 K/UL (ref 1.8–7.7)
NEUTROPHILS NFR BLD: 72.4 % (ref 38–73)
NRBC BLD-RTO: 0 /100 WBC
PLATELET # BLD AUTO: 215 K/UL (ref 150–450)
PMV BLD AUTO: ABNORMAL FL (ref 9.2–12.9)
POTASSIUM SERPL-SCNC: 3.2 MMOL/L (ref 3.5–5.1)
RBC # BLD AUTO: 3.95 M/UL (ref 4–5.4)
SODIUM SERPL-SCNC: 140 MMOL/L (ref 136–145)
WBC # BLD AUTO: 5.23 K/UL (ref 3.9–12.7)

## 2024-06-19 PROCEDURE — 63600175 PHARM REV CODE 636 W HCPCS: Performed by: HOSPITALIST

## 2024-06-19 PROCEDURE — 85025 COMPLETE CBC W/AUTO DIFF WBC: CPT | Performed by: HOSPITALIST

## 2024-06-19 PROCEDURE — 25000003 PHARM REV CODE 250: Performed by: HOSPITALIST

## 2024-06-19 PROCEDURE — 99212 OFFICE O/P EST SF 10 MIN: CPT | Mod: ,,, | Performed by: STUDENT IN AN ORGANIZED HEALTH CARE EDUCATION/TRAINING PROGRAM

## 2024-06-19 PROCEDURE — 96376 TX/PRO/DX INJ SAME DRUG ADON: CPT

## 2024-06-19 PROCEDURE — 83735 ASSAY OF MAGNESIUM: CPT | Performed by: HOSPITALIST

## 2024-06-19 PROCEDURE — 80048 BASIC METABOLIC PNL TOTAL CA: CPT | Performed by: HOSPITALIST

## 2024-06-19 PROCEDURE — 36415 COLL VENOUS BLD VENIPUNCTURE: CPT | Performed by: HOSPITALIST

## 2024-06-19 PROCEDURE — G0378 HOSPITAL OBSERVATION PER HR: HCPCS

## 2024-06-19 RX ORDER — FERROUS SULFATE 325(65) MG
325 TABLET ORAL DAILY
Qty: 30 TABLET | Refills: 1 | Status: SHIPPED | OUTPATIENT
Start: 2024-06-19

## 2024-06-19 RX ORDER — AMOXICILLIN AND CLAVULANATE POTASSIUM 875; 125 MG/1; MG/1
1 TABLET, FILM COATED ORAL EVERY 12 HOURS
Qty: 14 TABLET | Refills: 0 | Status: SHIPPED | OUTPATIENT
Start: 2024-06-19 | End: 2024-06-28

## 2024-06-19 RX ORDER — NIFEDIPINE 30 MG/1
30 TABLET, EXTENDED RELEASE ORAL DAILY
Qty: 30 TABLET | Refills: 1 | Status: SHIPPED | OUTPATIENT
Start: 2024-06-20 | End: 2025-06-20

## 2024-06-19 RX ORDER — POTASSIUM CHLORIDE 20 MEQ/1
40 TABLET, EXTENDED RELEASE ORAL ONCE
Status: COMPLETED | OUTPATIENT
Start: 2024-06-19 | End: 2024-06-19

## 2024-06-19 RX ORDER — OXYCODONE AND ACETAMINOPHEN 5; 325 MG/1; MG/1
1 TABLET ORAL EVERY 8 HOURS PRN
Qty: 15 TABLET | Refills: 0 | Status: SHIPPED | OUTPATIENT
Start: 2024-06-19

## 2024-06-19 RX ORDER — ONDANSETRON 4 MG/1
4 TABLET, FILM COATED ORAL EVERY 6 HOURS PRN
Qty: 30 TABLET | Refills: 0 | Status: SHIPPED | OUTPATIENT
Start: 2024-06-19

## 2024-06-19 RX ADMIN — OXYCODONE HYDROCHLORIDE AND ACETAMINOPHEN 1 TABLET: 5; 325 TABLET ORAL at 04:06

## 2024-06-19 RX ADMIN — OXYCODONE HYDROCHLORIDE AND ACETAMINOPHEN 1 TABLET: 5; 325 TABLET ORAL at 12:06

## 2024-06-19 RX ADMIN — PIPERACILLIN SODIUM AND TAZOBACTAM SODIUM 4.5 G: 4; .5 INJECTION, POWDER, LYOPHILIZED, FOR SOLUTION INTRAVENOUS at 03:06

## 2024-06-19 RX ADMIN — NIFEDIPINE 30 MG: 30 TABLET, FILM COATED, EXTENDED RELEASE ORAL at 08:06

## 2024-06-19 RX ADMIN — POTASSIUM CHLORIDE 40 MEQ: 1500 TABLET, EXTENDED RELEASE ORAL at 08:06

## 2024-06-19 RX ADMIN — OXYCODONE HYDROCHLORIDE AND ACETAMINOPHEN 1 TABLET: 5; 325 TABLET ORAL at 08:06

## 2024-06-19 NOTE — PROGRESS NOTES
**Late Entry**  Patient Name: Amina Vazquez  Date: 2024  Service: Colon and Rectal Surgery    Subjective:  No acute events overnight. Pain improved, milder and more focal to RLQ. Denies nausea/vomiting, denies bloating/belching/hiccups. Denies flatus/BM. Denies fevers/chills/sweats. Feels improved, no other concerns.     Objective:  Vital Signs (Most Recent):  Temp: 98.2 °F (36.8 °C) (24)  Pulse: 73 (24)  Resp: 18 (24)  BP: (!) 156/90 (24)  SpO2: 99 % (24) Vital Signs (24h Range):  Temp:  [97.3 °F (36.3 °C)-99.1 °F (37.3 °C)] 98.2 °F (36.8 °C)  Pulse:  [63-81] 73  Resp:  [13-20] 18  SpO2:  [97 %-100 %] 99 %  BP: (140-168)/(86-98) 156/90      Weight: 73.9 kg (162 lb 14.7 oz)  Body mass index is 28.86 kg/m².     Intake/Output Summary (Last 24 hours) at 2024 0811  Last data filed at 2024 0619      Gross per 24 hour   Intake 1898.12 ml   Output --   Net 1898.12 ml       Physical Exam:  General: Alert, oriented, in no apparent distress  HEENT: Sclera anicteric, trachea midline  Lungs: Normal respiratory rate and effort on room air  Abdomen: Soft, mild RLQ TTP at [place]Burney's otherwise nontender throughout without rebound/guarding.  Extremities: Warm, well perfused, no edema  Neuro: Grossly intact, moves all extremities  Psych: Appropriate affect    Laboratory Studies:  WBC: 12.8 --> 6.6  Hgb 8.5 --> 7.4    CBC/BMP otherwise WNL    Imaging Studies:  None new    Assessment:  Amina Vazquez is a 43 y.o. year old female with history of HTN, anxiety and  x3 who presents with 11 hours of periumbilical/infraumbilical pain with nausea/vomiting and CT evidence of acute uncomplicated appendicitis without perforation or phlegmon. Following discussion of laparoscopic appendectomy vs nonoperative management, the patient wishes to proceed with nonoperagive management with antibiotics alone and understands the risk of (1) treatment failure, (2)  recurrence of appendicitis in next 5 years and (3) small risk of appendiceal neoplasm.    Plan:  - Again discussed the pros/cons of laparoscopic appendicitivs vs nonoperative management with the patient and her daughter; as the patient feels improved, she wishes to continue nonoperative management  - Continue Zosyn antibiosis  - Advance to CLD  - Cont serial abdominal exams with daily CBC  - Rest of care per primary    Charles Bucio MD  Staff Surgeon

## 2024-06-19 NOTE — PROGRESS NOTES
**Late Entry**  Patient Name: Amina Vazquez  Date: 2024  Service: Colon and Rectal Surgery    Subjective:  No acute events overnight. Pain continues to improve, notes minimal abdominal pain without worsening on CLD. Tolerating CLD without nausea/vomiting, denies bloating/belching/hiccups. Passing flatus, no BM. Began menstruation with menorrhagia.     Objective:  Vital Signs (Most Recent):  Temp: 98.2 °F (36.8 °C) (24 0403)  Pulse: 73 (24 0403)  Resp: 18 (24 0408)  BP: (!) 156/90 (24)  SpO2: 99 % (24) Vital Signs (24h Range):  Temp:  [97.3 °F (36.3 °C)-99.1 °F (37.3 °C)] 98.2 °F (36.8 °C)  Pulse:  [63-81] 73  Resp:  [13-20] 18  SpO2:  [97 %-100 %] 99 %  BP: (140-168)/(86-98) 156/90      Weight: 73.9 kg (162 lb 14.7 oz)  Body mass index is 28.86 kg/m².     Intake/Output Summary (Last 24 hours) at 2024 0811  Last data filed at 2024 0619      Gross per 24 hour   Intake 1898.12 ml   Output --   Net 1898.12 ml       Physical Exam:  General: Alert, oriented, in no apparent distress  HEENT: Sclera anicteric, trachea midline  Lungs: Normal respiratory rate and effort on room air  Abdomen: Soft, minimal RLQ TTP at [place]'s otherwise nontender throughout without rebound/guarding.  Extremities: Warm, well perfused, no edema  Neuro: Grossly intact, moves all extremities  Psych: Appropriate affect    Laboratory Studies:  WBC: 12.8 --> 6.6 --> 3.9  Hgb 8.5 --> 7.4 --> 6.5 --> 7.2 on repeat    CBC/BMP otherwise WNL    Imaging Studies:  None new    Assessment:  Amina Vazquez is a 43 y.o. year old female with history of HTN, anxiety and  x3 who presents with 11 hours of periumbilical/infraumbilical pain with nausea/vomiting and CT evidence of acute uncomplicated appendicitis without perforation or phlegmon. Following discussion of laparoscopic appendectomy vs nonoperative management, the patient wishes to proceed with nonoperagive management with  antibiotics alone and understands the risk of (1) treatment failure, (2) recurrence of appendicitis in next 5 years and (3) small risk of appendiceal neoplasm. Patient symptomatically resolving on Zosyn with normalization of leukocytosis and improving abdominal exam.    Plan:  - Advance to low residue diet  - Continue Zosyn antibiosis  - Cont serial abdominal exams with daily CBC  - Rest of care per primary, tentative plan for discharge tomorrow if symptoms do not worsen on LRD with plan to complete 10d total course of antibiotics    Charles Bucio MD  Staff Surgeon

## 2024-06-19 NOTE — PROGRESS NOTES
AVS reviewed with pt. All questions answered. PIV discontinued. No tele. Medications sent to pharmacy. Pt states she will come back to pick them up once she gets her purse.

## 2024-06-19 NOTE — PLAN OF CARE
CM met with pt for final discharge planning assessment. Pt is ready for discharge home today.    Pt will schedule her own follow-up apt with her PCP.    Meds to be delivered to bedside prior to discharge from [facility].    Pt confirms she has reliable transportation home.    Pt is ready for discharge from CM perspective.   06/19/24 1026   Final Note   Assessment Type Final Discharge Note   Anticipated Discharge Disposition Home   What phone number can be called within the next 1-3 days to see how you are doing after discharge? [phone]   Hospital Resources/Appts/Education Provided Provided patient/caregiver with written discharge plan information;Provided education on problems/symptoms using teachback;Appointment suggestion unavailable   Post-Acute Status   Discharge Delays None known at this time     [facility] - Med Surg ([address])  Discharge Final Note    Primary Care Provider: No, Primary Doctor    Expected Discharge Date: 6/19/2024    Final Discharge Note (most recent)       Final Note - 06/19/24 1026          Final Note    Assessment Type Final Discharge Note     Anticipated Discharge Disposition Home or Self Care     What phone number can be called within the next 1-3 days to see how you are doing after discharge? [phone] (P)      Hospital Resources/Appts/Education Provided Provided patient/caregiver with written discharge plan information;Provided education on problems/symptoms using teachback;Appointment suggestion unavailable (P)         Post-Acute Status    Discharge Delays None known at this time (P)                      Important Message from Medicare

## 2024-06-19 NOTE — PROGRESS NOTES
Patient Name: Amina Vazquez  Date: 6/19/2024  Service: Colon and Rectal Surgery    Subjective:  No acute events overnight. Notes resolution of abdominal pain, minimal discomfort. Passing flatus, no BM. Tolerating LRD without worsening pain or nausea/vomiting. Ambulating, voiding freely. No other concerns.    Medications:    Current Facility-Administered Medications:     acetaminophen tablet 1,000 mg, 1,000 mg, Oral, Q8H PRN, Jeremiah Hendrickson MD, 1,000 mg at 06/17/24 1231    HYDROmorphone injection 0.5 mg, 0.5 mg, Intravenous, Q4H PRN, Rafal Weinstein MD    melatonin tablet 6 mg, 6 mg, Oral, Nightly PRN, Conchis Thompson FNP    naloxone 0.4 mg/mL injection 0.02 mg, 0.02 mg, Intravenous, PRN, Jeremiah Hendrickson MD    NIFEdipine 24 hr tablet 30 mg, 30 mg, Oral, Daily, Jeremiah Hendrickson MD, 30 mg at 06/19/24 0846    ondansetron injection 8 mg, 8 mg, Intravenous, Q8H PRN, Jeremiah Hendrickson MD, 8 mg at 06/18/24 2054    oxyCODONE-acetaminophen 5-325 mg per tablet 1 tablet, 1 tablet, Oral, Q4H PRN, Rafal Weinstein MD, 1 tablet at 06/19/24 0845    piperacillin-tazobactam (ZOSYN) 4.5 g in dextrose 5 % in water (D5W) 100 mL IVPB (MB+), 4.5 g, Intravenous, Q8H, Jeremiah Hendrickson MD, Last Rate: 25 mL/hr at 06/19/24 0309, 4.5 g at 06/19/24 0309    promethazine (PHENERGAN) 25 mg in sodium chloride 0.9% 50 mL IVPB, 25 mg, Intravenous, Q6H PRN, Jeremiah Hendrickson MD    sodium chloride 0.9% flush 10 mL, 10 mL, Intravenous, PRN, Conchis Thompson FNP    Current Outpatient Medications:     vitamin D (VITAMIN D3) 1000 units Tab, Take 1 tablet by mouth once daily., Disp: , Rfl:     amoxicillin-clavulanate 875-125mg (AUGMENTIN) 875-125 mg per tablet, Take 1 tablet by mouth every 12 (twelve) hours. for 7 days, Disp: 14 tablet, Rfl: 0    ferrous sulfate (FEOSOL) 325 mg (65 mg iron) Tab tablet, Take 1 tablet (325 mg total) by mouth once daily., Disp: 30 tablet, Rfl: 1    [START ON 6/20/2024] NIFEdipine (PROCARDIA-XL) 30 MG (OSM)  24 hr tablet, Take 1 tablet (30 mg total) by mouth once daily., Disp: 30 tablet, Rfl: 1    ondansetron (ZOFRAN) 4 MG tablet, Take 1 tablet (4 mg total) by mouth every 6 (six) hours as needed for Nausea., Disp: 30 tablet, Rfl: 0    oxyCODONE-acetaminophen (PERCOCET) 5-325 mg per tablet, Take 1 tablet by mouth every 8 (eight) hours as needed for Pain., Disp: 15 tablet, Rfl: 0    Vital Signs:  Vitals:    24 0845   BP:    Pulse:    Resp: 16   Temp:        In/Out:  Intake/Output - Last 3 Shifts          07 0659  07 0659  07 0659    P.O.  300     I.V. (mL/kg) 2525 (34.2)      IV Piggyback 479.6 417.1     Total Intake(mL/kg) 3004.6 (40.7) 717.1 (9.7)     Urine (mL/kg/hr)  1200 (0.7)     Stool  0     Total Output  1200     Net +3004.6 -482.9            Urine Occurrence  2 x     Stool Occurrence  2 x             Physical Exam:  General: Alert, oriented, in no apparent distress  HEENT: Sclera anicteric, trachea midline  Lungs: Normal respiratory rate and effort on room air  Abdomen: Soft, nontender, nondistended.  Extremities: Warm, well perfused, no edema, SCDs in place  Neuro: Grossly intact, moves all extremities  Psych: Appropriate affect    Laboratory Studies:  Complete Blood Count:  Recent Labs   Lab 24  0512 24  0750 24  0524   WBC 3.94 3.78* 5.23   RBC 3.34* 3.69* 3.95*   HGB 6.5* 7.2* 7.7*   HCT 24.5* 26.9* 27.9*    203 215     Basic Chemistry Panel:  Recent Labs   Lab 24  0523      K 3.2*      CO2 23   BUN 5*   CREATININE 0.7   CALCIUM 8.7     Imaging Studies:  None new    Assessment:  Amina Vazquez is a 43 y.o. year old female with history of HTN, anxiety and  x3 who presents with 11 hours of periumbilical/infraumbilical pain with nausea/vomiting and CT evidence of acute uncomplicated appendicitis without perforation or phlegmon. Following discussion of laparoscopic appendectomy vs nonoperative management, the  patient wishes to proceed with nonoperagive management with antibiotics alone and understands the risk of (1) treatment failure, (2) recurrence of appendicitis in next 5 years and (3) small risk of appendiceal neoplasm. Patient symptomatically resolving on Zosyn with normalization of leukocytosis and improving abdominal exam.     Plan:  - Cont regular diet  - Plan to discharge with 7 day of Augmentin BID to complete 10 day total course of antibiotics  - Patient instructed about signs/symptoms of treatment failure and recurrent appendicitis which should prompt return to ED or evaluation in general surgery clinic  - Also discussed the importance of completing a colonoscopy following nonoperative management of acute appendicitis in adults >40 years old  - Patient can follow up in general surgery clinic as needed for any recurrent/intermittent symptoms or concerns     Charles Bucio MD  Staff Surgeon

## 2024-06-19 NOTE — DISCHARGE INSTRUCTIONS
Take all medications as prescribed.  Eat a strict low salt cardiac diet.  Follow up with your physicians as scheduled - pcp within 1 week and obgyn within 2 weeks.  Thank you for trusting [facility] and Dr. Weinstein with your care.  We are honored that you entrusted us with your healthcare needs. Your satisfaction is very important to us and we hope you have been very pleased with your experience at [facility]. After your discharge you may receive a survey asking you to rate your hospital experience. We encourage you to take the time to complete the survey as your feedback allows us to identify areas for improvement as well as recognize our staff.   We hope that you have received the very best care possible during your hospitalization at [facility], as your satisfaction is our top priority.

## 2024-06-23 NOTE — DISCHARGE SUMMARY
[facility] [address] Medicine  Discharge Summary      Patient Name: mAina Vazquez  MRN: [id number]  KAVIN: [id number]  Patient Class: OP- Observation  Admission Date: 2024  Hospital Length of Stay: 0 days  Discharge Date and Time: 2024 11:25 AM  Attending Physician: Solange att. providers found   Discharging Provider: Rafal Weinstein MD  Primary Care Provider: Solange, Primary Doctor    Primary Care Team: Networked reference to record PCT     HPI:   Patient is a 44 year woman with history of hypertension, dyslipidemia, history of menorrhagia and uterine leiomyoma with chronic blood loss resulting in iron-deficiency anemia who presents to the emergency department with nausea, vomiting, and abdominal pain.  Patient reports she has been having some intermittent abdominal discomfort but markedly worse since 5 am this morning.  Denies any diarrhea.  Patient reports prior  sections but otherwise no other prior surgeries.  Patient reports she took at Tramadol 50 mg by mouth this morning due severity of the pain with some improvement.    Denies any tobacco use, alcohol use, or illicit drug.  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  No sick contacts.  No recent travel.    CT scan shows evidence of acute appendicitis.    Goals of Care Treatment Preferences:  Code Status: Full Code      Consults:   Consults (From admission, onward)          Status Ordering Provider     Inpatient consult to General surgery  Once        Provider:  Charles Bucio MD    Completed IBAN SANDERS.          Hospital Course By Problem:   * Acute appendicitis with localized peritonitis, without perforation, abscess, or gangrene  Placed in observation status.  Patient presents with acute onset of nausea, vomiting, and abdominal pain.  Urine pregnancy test negative.  CT scan showed evidence of acute appendicitis. General surgery consulted and offered surgery versus nonoperative management.  Patient elected  for nonoperative management.  Clinically improving with antibiotics and remains afebrile with improved pain.  She tolerated her diet and has clinically improved - now asking to go home.  Cleared for discharge by general surgery.  She was seen and examined today.  Complete treatment with augmentin at discharge and will need outpatient colonoscopy per surgery recs.     Iron deficiency anemia due to chronic blood loss  -Noted chronic iron deficiency anemia likely due to menorrhagia.  -Hb 8.5 on admit.  Did not require blood transfusion.  Hb 7.7 at discharge.  -We will need to restart oral iron on discharge.    -Patient advised to undergo partial hysterectomy previously but declined. Follow up with Gynecology on discharge to reconsider definitive treat.     Hypokalemia  -K replaced today.    -Follow up with pcp within 1 week for lab check.     Hypertension  -BP normal to mildly elevated  -HCTZ stopped due to hypokalemia  -Continue nifedipine    Final Active Diagnoses:    Diagnosis Date Noted POA    PRINCIPAL PROBLEM:  Acute appendicitis with localized peritonitis, without perforation, abscess, or gangrene [K35.30] 06/16/2024 Yes    Iron deficiency anemia due to chronic blood loss [D50.0] 08/23/2013 Yes    Hypokalemia [E87.6] 06/16/2024 Yes    Hypertension [I10] 09/18/2018 Yes      Problems Resolved During this Admission:       Discharged Condition: stable    Disposition: Home or Self Care    Follow Up:    Patient Instructions:      Diet Cardiac     Notify your health care provider if you experience any of the following:  increased confusion or weakness     Notify your health care provider if you experience any of the following:  persistent dizziness, light-headedness, or visual disturbances     Notify your health care provider if you experience any of the following:  worsening rash     Notify your health care provider if you experience any of the following:  severe persistent headache     Notify your health care  "provider if you experience any of the following:  difficulty breathing or increased cough     Notify your health care provider if you experience any of the following:  severe uncontrolled pain     Notify your health care provider if you experience any of the following:  persistent nausea and vomiting or diarrhea     Notify your health care provider if you experience any of the following:  temperature >100.4     Activity as tolerated       Significant Diagnostic Studies: Labs: BMP: No results for input(s): "GLU", "NA", "K", "CL", "CO2", "BUN", "CREATININE", "CALCIUM", "MG" in the last 48 hours., CMP No results for input(s): "NA", "K", "CL", "CO2", "GLU", "BUN", "CREATININE", "CALCIUM", "PROT", "ALBUMIN", "BILITOT", "ALKPHOS", "AST", "ALT", "ANIONGAP", "ESTGFRAFRICA", "EGFRNONAA" in the last 48 hours., CBC No results for input(s): "WBC", "HGB", "HCT", "PLT" in the last 48 hours., INR   Lab Results   Component Value Date    INR 1.0 08/23/2013   , Lipid Panel No results found for: "CHOL", "HDL", "LDLCALC", "TRIG", "CHOLHDL", Troponin No results for input(s): "TROPONINI" in the last 168 hours., and A1C: No results for input(s): "HGBA1C" in the last 4320 hours.    Pending Diagnostic Studies:       None           Medications:  Reconciled Home Medications:      Medication List        START taking these medications      amoxicillin-clavulanate 875-125mg 875-125 mg per tablet  Commonly known as: AUGMENTIN  Take 1 tablet by mouth every 12 (twelve) hours. for 7 days     NIFEdipine 30 MG (OSM) 24 hr tablet  Commonly known as: PROCARDIA-XL  Take 1 tablet (30 mg total) by mouth once daily.     ondansetron 4 MG tablet  Commonly known as: ZOFRAN  Take 1 tablet (4 mg total) by mouth every 6 (six) hours as needed for Nausea.     oxyCODONE-acetaminophen 5-325 mg per tablet  Commonly known as: PERCOCET  Take 1 tablet by mouth every 8 (eight) hours as needed for Pain.            CONTINUE taking these medications      ferrous sulfate 325 " mg (65 mg iron) Tab tablet  Commonly known as: FEOSOL  Take 1 tablet (325 mg total) by mouth once daily.     vitamin D 1000 units Tab  Commonly known as: VITAMIN D3  Take 1 tablet by mouth once daily.            STOP taking these medications      docusate sodium 100 MG capsule  Commonly known as: COLACE     hydroCHLOROthiazide 25 MG tablet  Commonly known as: HYDRODIURIL     polyethylene glycol 17 gram/dose powder  Commonly known as: GLYCOLAX              Indwelling Lines/Drains at time of discharge:   Lines/Drains/Airways       None                   Time spent on the discharge of patient: 35 minutes         Rafal Weinstein MD  Department of Hospital Medicine  [facility] ([address])

## 2024-12-08 ENCOUNTER — HOSPITAL ENCOUNTER (EMERGENCY)
Facility: OTHER | Age: 44
Discharge: HOME | End: 2024-12-08
Attending: EMERGENCY MEDICINE
Payer: MEDICAID

## 2024-12-08 VITALS
RESPIRATION RATE: 18 BRPM | DIASTOLIC BLOOD PRESSURE: 82 MMHG | TEMPERATURE: 98 F | OXYGEN SATURATION: 100 % | SYSTOLIC BLOOD PRESSURE: 135 MMHG | HEART RATE: 77 BPM

## 2024-12-08 DIAGNOSIS — E87.6 HYPOKALEMIA: ICD-10-CM

## 2024-12-08 DIAGNOSIS — R11.2 NAUSEA AND VOMITING, UNSPECIFIED VOMITING TYPE: Primary | ICD-10-CM

## 2024-12-08 DIAGNOSIS — R68.83 CHILLS: ICD-10-CM

## 2024-12-08 DIAGNOSIS — D64.9 ANEMIA, UNSPECIFIED TYPE: ICD-10-CM

## 2024-12-08 LAB
ALBUMIN SERPL BCP-MCNC: 4 G/DL (ref 3.5–5.2)
ALP SERPL-CCNC: 46 U/L (ref 40–150)
ALT SERPL W/O P-5'-P-CCNC: 14 U/L (ref 10–44)
ANION GAP SERPL CALC-SCNC: 10 MMOL/L (ref 8–16)
AST SERPL-CCNC: 20 U/L (ref 10–40)
BASOPHILS # BLD AUTO: 0.02 K/UL (ref 0–0.2)
BASOPHILS NFR BLD: 0.2 % (ref 0–1.9)
BILIRUB SERPL-MCNC: 0.4 MG/DL (ref 0.1–1)
BILIRUB UR QL STRIP: NEGATIVE
BUN SERPL-MCNC: 10 MG/DL (ref 6–20)
CALCIUM SERPL-MCNC: 9 MG/DL (ref 8.7–10.5)
CHLORIDE SERPL-SCNC: 103 MMOL/L (ref 95–110)
CLARITY UR: CLEAR
CO2 SERPL-SCNC: 20 MMOL/L (ref 23–29)
COLOR UR: YELLOW
CREAT SERPL-MCNC: 0.7 MG/DL (ref 0.5–1.4)
CTP QC/QA: YES
DIFFERENTIAL METHOD BLD: ABNORMAL
EOSINOPHIL # BLD AUTO: 0.1 K/UL (ref 0–0.5)
EOSINOPHIL NFR BLD: 0.9 % (ref 0–8)
ERYTHROCYTE [DISTWIDTH] IN BLOOD BY AUTOMATED COUNT: 19.9 % (ref 11.5–14.5)
EST. GFR  (NO RACE VARIABLE): >60 ML/MIN/1.73 M^2
GLUCOSE SERPL-MCNC: 87 MG/DL (ref 70–110)
GLUCOSE UR QL STRIP: NEGATIVE
HCT VFR BLD AUTO: 31.4 % (ref 37–48.5)
HGB BLD-MCNC: 8.4 G/DL (ref 12–16)
HGB UR QL STRIP: NEGATIVE
IMM GRANULOCYTES # BLD AUTO: 0.02 K/UL (ref 0–0.04)
IMM GRANULOCYTES NFR BLD AUTO: 0.2 % (ref 0–0.5)
KETONES UR QL STRIP: NEGATIVE
LEUKOCYTE ESTERASE UR QL STRIP: NEGATIVE
LIPASE SERPL-CCNC: 22 U/L (ref 4–60)
LYMPHOCYTES # BLD AUTO: 0.3 K/UL (ref 1–4.8)
LYMPHOCYTES NFR BLD: 4 % (ref 18–48)
MAGNESIUM SERPL-MCNC: 1.7 MG/DL (ref 1.6–2.6)
MCH RBC QN AUTO: 17.3 PG (ref 27–31)
MCHC RBC AUTO-ENTMCNC: 26.8 G/DL (ref 32–36)
MCV RBC AUTO: 65 FL (ref 82–98)
MONOCYTES # BLD AUTO: 0.2 K/UL (ref 0.3–1)
MONOCYTES NFR BLD: 2.5 % (ref 4–15)
NEUTROPHILS # BLD AUTO: 7.4 K/UL (ref 1.8–7.7)
NEUTROPHILS NFR BLD: 92.2 % (ref 38–73)
NITRITE UR QL STRIP: NEGATIVE
NRBC BLD-RTO: 0 /100 WBC
PH UR STRIP: 6 [PH] (ref 5–8)
PLATELET # BLD AUTO: 164 K/UL (ref 150–450)
PMV BLD AUTO: ABNORMAL FL (ref 9.2–12.9)
POC MOLECULAR INFLUENZA A AGN: NEGATIVE
POC MOLECULAR INFLUENZA B AGN: NEGATIVE
POTASSIUM SERPL-SCNC: 3.4 MMOL/L (ref 3.5–5.1)
PROT SERPL-MCNC: 7.7 G/DL (ref 6–8.4)
PROT UR QL STRIP: NEGATIVE
RBC # BLD AUTO: 4.86 M/UL (ref 4–5.4)
SODIUM SERPL-SCNC: 133 MMOL/L (ref 136–145)
SP GR UR STRIP: 1.01 (ref 1–1.03)
URN SPEC COLLECT METH UR: NORMAL
UROBILINOGEN UR STRIP-ACNC: NEGATIVE EU/DL
WBC # BLD AUTO: 8.07 K/UL (ref 3.9–12.7)

## 2024-12-08 PROCEDURE — 25000003 PHARM REV CODE 250: Performed by: EMERGENCY MEDICINE

## 2024-12-08 PROCEDURE — 80053 COMPREHEN METABOLIC PANEL: CPT | Performed by: EMERGENCY MEDICINE

## 2024-12-08 PROCEDURE — 83690 ASSAY OF LIPASE: CPT | Performed by: EMERGENCY MEDICINE

## 2024-12-08 PROCEDURE — 63600175 PHARM REV CODE 636 W HCPCS: Performed by: EMERGENCY MEDICINE

## 2024-12-08 PROCEDURE — 96375 TX/PRO/DX INJ NEW DRUG ADDON: CPT

## 2024-12-08 PROCEDURE — 83735 ASSAY OF MAGNESIUM: CPT | Performed by: EMERGENCY MEDICINE

## 2024-12-08 PROCEDURE — 99284 EMERGENCY DEPT VISIT MOD MDM: CPT | Mod: 25

## 2024-12-08 PROCEDURE — 81003 URINALYSIS AUTO W/O SCOPE: CPT | Performed by: PHYSICIAN ASSISTANT

## 2024-12-08 PROCEDURE — 85025 COMPLETE CBC W/AUTO DIFF WBC: CPT | Performed by: EMERGENCY MEDICINE

## 2024-12-08 PROCEDURE — 96374 THER/PROPH/DIAG INJ IV PUSH: CPT

## 2024-12-08 RX ORDER — POTASSIUM CHLORIDE 20 MEQ/1
20 TABLET, EXTENDED RELEASE ORAL
Status: COMPLETED | OUTPATIENT
Start: 2024-12-08 | End: 2024-12-08

## 2024-12-08 RX ORDER — ONDANSETRON 4 MG/1
4 TABLET, ORALLY DISINTEGRATING ORAL EVERY 6 HOURS PRN
Qty: 15 TABLET | Refills: 0 | Status: SHIPPED | OUTPATIENT
Start: 2024-12-08

## 2024-12-08 RX ORDER — KETOROLAC TROMETHAMINE 30 MG/ML
10 INJECTION, SOLUTION INTRAMUSCULAR; INTRAVENOUS
Status: DISCONTINUED | OUTPATIENT
Start: 2024-12-08 | End: 2024-12-08

## 2024-12-08 RX ORDER — ONDANSETRON HYDROCHLORIDE 2 MG/ML
4 INJECTION, SOLUTION INTRAVENOUS
Status: COMPLETED | OUTPATIENT
Start: 2024-12-08 | End: 2024-12-08

## 2024-12-08 RX ORDER — ACETAMINOPHEN 500 MG
1000 TABLET ORAL EVERY 6 HOURS PRN
Qty: 50 TABLET | Refills: 0 | Status: SHIPPED | OUTPATIENT
Start: 2024-12-08

## 2024-12-08 RX ORDER — ONDANSETRON 4 MG/1
4 TABLET, ORALLY DISINTEGRATING ORAL
Status: DISCONTINUED | OUTPATIENT
Start: 2024-12-08 | End: 2024-12-08

## 2024-12-08 RX ORDER — KETOROLAC TROMETHAMINE 30 MG/ML
10 INJECTION, SOLUTION INTRAMUSCULAR; INTRAVENOUS
Status: COMPLETED | OUTPATIENT
Start: 2024-12-08 | End: 2024-12-08

## 2024-12-08 RX ORDER — POTASSIUM CHLORIDE 750 MG/1
10 TABLET, EXTENDED RELEASE ORAL DAILY
Qty: 10 TABLET | Refills: 0 | Status: SHIPPED | OUTPATIENT
Start: 2024-12-08 | End: 2024-12-18

## 2024-12-08 RX ADMIN — ONDANSETRON 4 MG: 2 INJECTION INTRAMUSCULAR; INTRAVENOUS at 06:12

## 2024-12-08 RX ADMIN — KETOROLAC TROMETHAMINE 10 MG: 30 INJECTION, SOLUTION INTRAMUSCULAR; INTRAVENOUS at 06:12

## 2024-12-08 RX ADMIN — POTASSIUM CHLORIDE 20 MEQ: 1500 TABLET, EXTENDED RELEASE ORAL at 05:12

## 2024-12-08 NOTE — FIRST PROVIDER EVALUATION
Emergency Department TeleTriage Encounter Note      CHIEF COMPLAINT    Chief Complaint   Patient presents with    Vomiting     N/V x 1 day. Denies abdominal pain.        VITAL SIGNS   Initial Vitals [12/08/24 1430]   BP Pulse Resp Temp SpO2   (!) 160/93 77 19 98.7 °F (37.1 °C) 97 %      MAP       --            ALLERGIES    Review of patient's allergies indicates:  No Known Allergies    PROVIDER TRIAGE NOTE  Patient presents with nausea and vomiting. No abdominal pain, urinary symptoms, diarrhea.        ORDERS  Labs Reviewed - No data to display    ED Orders (720h ago, onward)      None              Virtual Visit Note: The provider triage portion of this emergency department evaluation and documentation was performed via [employer], a HIPAA-compliant telemedicine application, in concert with a tele-presenter in the room. A face to face patient evaluation with one of my colleagues will occur once the patient is placed in an emergency department room.      DISCLAIMER: This note was prepared with [employer] voice recognition transcription software. Garbled syntax, mangled pronouns, and other bizarre constructions may be attributed to that software system.

## 2024-12-08 NOTE — ED PROVIDER NOTES
"Encounter Date: 2024    SCRIBE #1 NOTE: I, Ana Luisa Jeronimo, am scribing for, and in the presence of,  Lane Bojorquez MD. I have scribed the following portions of the note - Other sections scribed: HPI, ROS.       History     Chief Complaint   Patient presents with    Vomiting     N/V x 1 day. Denies abdominal pain. [facility]     Time seen by provider: 3:59 PM    This is a very pleasant 43 y.o. female who presents to the ED via EMS with complaints of vomiting that started this morning. Associated symptoms include dehydration, chills, weakness, left-sided chest pain, constipation, and breast tenderness. Pt reports an elevated BP prior to arrival. Pt notes that she was feeling fine yesterday and symptoms worsened upon wake up this morning. She notes that she had 4x episodes of emesis that looked like "white acid". She mentions that she has been in close contact with a family member who has cold-like symptoms. She reports having abnormal menstrual cycles that occur every two weeks for the past two months. She notes experiencing similar symptoms once before that resulted in anemia. She state that she is adherent to her blood pressure medication, Hydrochlorothiazide, and she last took it at 9 PM last night. Pt notes that she had appendicitis a few months ago that was treated with antibiotics. Pt denies any other complaints at this time.      The history is provided by the patient and medical records.   Emesis       Review of patient's allergies indicates:  No Known Allergies  Past Medical History:   Diagnosis Date    Anemia     Hypertension      Past Surgical History:   Procedure Laterality Date     SECTION      x 2     Family History   Problem Relation Name Age of Onset    Hypertension Mother      Diabetes Neg Hx      Cancer Neg Hx      Stroke Neg Hx      Heart disease Neg Hx       Social History     Tobacco Use    Smoking status: Never    Smokeless tobacco: Never   Substance Use Topics    Alcohol use: No "    Drug use: No     Review of Systems  Constitutional- (+) chills,no fever  HEENT-no congestion  Eyes-no redness  Respiratory-no shortness of breath  Cardio- (+) chest pain (L side)  GI- (+) vomiting, (+) constipation, no abdominal pain  Endocrine-no cold intolerance  -no difficulty urinating  MSK-no myalgias  Skin-no rashes  Allergy-no environmental allergy  Neurologic- (+) weakness, no headache  Hematology-no swollen nodes  Behavioral-no confusion  (+) breast tenderness, (+) dehydration    Physical Exam     Initial Vitals [12/08/24 1430]   BP Pulse Resp Temp SpO2   (!) 160/93 77 19 98.7 °F (37.1 °C) 97 %      MAP       --         Physical Exam  Constitutional:  Uncomfortable appearing 43-year-old female in mild distress  Eyes: Conjunctivae normal.  ENT       Head: Normocephalic, atraumatic.       Nose: Normal external appearance        Mouth/Throat: no strigulous respirations   Hematological/Lymphatic/Immunilogical: no visible lymphadenopathy   Cardiovascular: Normal rate,   Respiratory: Normal respiratory effort.   Gastrointestinal:  Soft, diffusely tender, no rebound, no guarding  Musculoskeletal: Normal range of motion in all extremities. No obvious deformities or swelling.  Neurologic: Alert, oriented. Normal speech and language. No gross focal neurologic deficits are appreciated.  Skin: Skin is warm, dry. No rash noted.  Psychiatric: Mood and affect are normal.     ED Course   Procedures  Labs Reviewed   CBC W/ AUTO DIFFERENTIAL - Abnormal       Result Value    WBC 8.07      RBC 4.86      Hemoglobin 8.4 (*)     Hematocrit 31.4 (*)     MCV 65 (*)     MCH 17.3 (*)     MCHC 26.8 (*)     RDW 19.9 (*)     Platelets 164      MPV SEE COMMENT      Immature Granulocytes 0.2      Gran # (ANC) 7.4      Immature Grans (Abs) 0.02      Lymph # 0.3 (*)     Mono # 0.2 (*)     Eos # 0.1      Baso # 0.02      nRBC 0      Gran % 92.2 (*)     Lymph % 4.0 (*)     Mono % 2.5 (*)     Eosinophil % 0.9      Basophil % 0.2       Differential Method Automated     COMPREHENSIVE METABOLIC PANEL - Abnormal    Sodium 133 (*)     Potassium 3.4 (*)     Chloride 103      CO2 20 (*)     Glucose 87      BUN 10      Creatinine 0.7      Calcium 9.0      Total Protein 7.7      Albumin 4.0      Total Bilirubin 0.4      Alkaline Phosphatase 46      AST 20      ALT 14      eGFR >60      Anion Gap 10     URINALYSIS, REFLEX TO URINE CULTURE    Specimen UA Urine, Clean Catch      Color, UA Yellow      Appearance, UA Clear      pH, UA 6.0      Specific Gravity, UA 1.010      Protein, UA Negative      Glucose, UA Negative      Ketones, UA Negative      Bilirubin (UA) Negative      Occult Blood UA Negative      Nitrite, UA Negative      Urobilinogen, UA Negative      Leukocytes, UA Negative      Narrative:     Specimen Source->Urine   LIPASE    Lipase 22     MAGNESIUM    Magnesium 1.7     POCT INFLUENZA A/B MOLECULAR    POC Molecular Influenza A Ag Negative      POC Molecular Influenza B Ag Negative       Acceptable Yes            Imaging Results    None          Medications   potassium chloride SA CR tablet 20 mEq (20 mEq Oral Given 12/8/24 1745)   ketorolac injection 9.999 mg (9.999 mg Intravenous Given 12/8/24 1811)   ondansetron injection 4 mg (4 mg Intravenous Given 12/8/24 1811)     Medical Decision Making  Differential diagnosis-flu, COVID, gastroenteritis, food poisoning, dehydration, anemia, GI bleed      H&H stable.  After administration of antiemetics in the emergency department patent improvement in symptoms.    Demonstrated an element of hypokalemia.  Will plan for re supplementation of the same, flu and COVID negative.    Given that she is tolerating orals at this time labs relatively otherwise unrevealing with stable vital signs plan will be for this patient to undergo discharge at this time, outpatient follow-up and returning case of any worsening symptoms.    Problems Addressed:  Anemia, unspecified type: chronic illness or  injury  Chills: acute illness or injury  Hypokalemia: acute illness or injury  Nausea and vomiting, unspecified vomiting type: acute illness or injury    Amount and/or Complexity of Data Reviewed  External Data Reviewed: labs and notes.     Details: History of hypertension and sinusitis  Labs: ordered. Decision-making details documented in ED Course.    Risk  OTC drugs.  Prescription drug management.  Parenteral controlled substances.            Scribe Attestation:   Scribe #1: I performed the above scribed service and the documentation accurately describes the services I performed. I attest to the accuracy of the note.              Physician Attestation for Scribe: I, lane bojorquez, reviewed documentation as scribed in my presence, which is both accurate and complete.                   Clinical Impression:  Final diagnoses:  [R11.2] Nausea and vomiting, unspecified vomiting type (Primary)  [R68.83] Chills  [E87.6] Hypokalemia  [D64.9] Anemia, unspecified type          ED Disposition Condition    Discharge Stable          ED Prescriptions       Medication Sig Dispense Start Date End Date Auth. Provider    ondansetron (ZOFRAN-ODT) 4 MG TbDL Take 1 tablet (4 mg total) by mouth every 6 (six) hours as needed. 15 tablet 12/8/2024 -- Lane Bojorquez MD    acetaminophen (TYLENOL) 500 MG tablet Take 2 tablets (1,000 mg total) by mouth every 6 (six) hours as needed. 50 tablet 12/8/2024 -- Lane Bojorquez MD    potassium chloride (KLOR-CON) 10 MEQ TbSR Take 1 tablet (10 mEq total) by mouth once daily. for 10 days 10 tablet 12/8/2024 12/18/2024 Lane Bojorquez MD          Follow-up Information       Follow up With Specialties Details Why Contact Info    [facility] - Emergency Dept Emergency Medicine Go to  As needed, For a follow up visit about today [address]  [facility] [address] [phone]             Lane Bojorquez MD  12/11/24 7325

## 2024-12-08 NOTE — Clinical Note
"Amina"Amina George" George was seen and treated in our emergency department on 12/8/2024.  She may return to work on 12/11/2024.       If you have any questions or concerns, please don't hesitate to call.      Lane Bojorquez MD"

## 2024-12-09 NOTE — DISCHARGE INSTRUCTIONS
Mrs. Vazquez,    Thank you for letting me care for you today! It was nice meeting you, and I hope you feel better soon.   If you would like access to your chart and what was done today please utilize the [facility].   Please come back to [facility] for all of your future medical needs.    Our goal in the emergency department is to always give you outstanding care and exceptional service. You may receive a survey by mail or e-mail in the next week regarding your experience in our ED. We would greatly appreciate you completing and returning the survey. Your feedback provides us with a way to recognize our staff who give very good care and it helps us learn how to improve when your experience was below our aspiration of excellence.     Sincerely,    Lane Bojorquez MD  Board Certified Emergency Physician

## 2025-01-16 ENCOUNTER — TELEPHONE (OUTPATIENT)
Dept: OBSTETRICS AND GYNECOLOGY | Facility: CLINIC | Age: 45
End: 2025-01-16
Payer: MEDICAID

## 2025-01-16 NOTE — TELEPHONE ENCOUNTER
----- Message from Savana sent at 1/16/2025  2:13 PM CST -----  Regarding: new pt appt  Name of Who is Calling:  pt        What is the request in detail:  Pt is calling to make a new pt appt for heavy and frequent cycles. Pt has Medicaid and I am unable to jarad her. Please call to schedule.         Can the clinic reply by [facility]:          What Number to Call Back [facility]: [phone]

## 2025-07-29 ENCOUNTER — HOSPITAL ENCOUNTER (EMERGENCY)
Facility: OTHER | Age: 45
Discharge: HOME | End: 2025-07-30
Attending: EMERGENCY MEDICINE
Payer: MEDICAID

## 2025-07-29 DIAGNOSIS — N94.6 DYSMENORRHEA: ICD-10-CM

## 2025-07-29 DIAGNOSIS — D64.9 ANEMIA, UNSPECIFIED TYPE: Primary | ICD-10-CM

## 2025-07-29 LAB
ABSOLUTE EOSINOPHIL (OHS): 0.17 K/UL
ABSOLUTE MONOCYTE (OHS): 0.43 K/UL (ref 0.3–1)
ABSOLUTE NEUTROPHIL COUNT (OHS): 2.46 K/UL (ref 1.8–7.7)
ANION GAP (OHS): 7 MMOL/L (ref 8–16)
B-HCG UR QL: NEGATIVE
BASOPHILS # BLD AUTO: 0.05 K/UL
BASOPHILS NFR BLD AUTO: 1.2 %
BUN SERPL-MCNC: 9 MG/DL (ref 6–20)
CALCIUM SERPL-MCNC: 8.8 MG/DL (ref 8.7–10.5)
CHLORIDE SERPL-SCNC: 103 MMOL/L (ref 95–110)
CO2 SERPL-SCNC: 26 MMOL/L (ref 23–29)
CREAT SERPL-MCNC: 0.7 MG/DL (ref 0.5–1.4)
CTP QC/QA: YES
ERYTHROCYTE [DISTWIDTH] IN BLOOD BY AUTOMATED COUNT: 25 % (ref 11.5–14.5)
GFR SERPLBLD CREATININE-BSD FMLA CKD-EPI: >60 ML/MIN/1.73/M2
GLUCOSE SERPL-MCNC: 97 MG/DL (ref 70–110)
HCT VFR BLD AUTO: 27.2 % (ref 37–48.5)
HGB BLD-MCNC: 7.3 GM/DL (ref 12–16)
IMM GRANULOCYTES # BLD AUTO: 0.01 K/UL (ref 0–0.04)
IMM GRANULOCYTES NFR BLD AUTO: 0.2 % (ref 0–0.5)
INDIRECT COOMBS: NORMAL
LYMPHOCYTES # BLD AUTO: 1.2 K/UL (ref 1–4.8)
MCH RBC QN AUTO: 18.4 PG (ref 27–31)
MCHC RBC AUTO-ENTMCNC: 26.8 G/DL (ref 32–36)
MCV RBC AUTO: 69 FL (ref 82–98)
NUCLEATED RBC (/100WBC) (OHS): 0 /100 WBC
PLATELET # BLD AUTO: 236 K/UL (ref 150–450)
PMV BLD AUTO: ABNORMAL FL
POTASSIUM SERPL-SCNC: 3.2 MMOL/L (ref 3.5–5.1)
RBC # BLD AUTO: 3.97 M/UL (ref 4–5.4)
RELATIVE EOSINOPHIL (OHS): 3.9 %
RELATIVE LYMPHOCYTE (OHS): 27.8 % (ref 18–48)
RELATIVE MONOCYTE (OHS): 10 % (ref 4–15)
RELATIVE NEUTROPHIL (OHS): 56.9 % (ref 38–73)
RH BLD: NORMAL
SODIUM SERPL-SCNC: 136 MMOL/L (ref 136–145)
SPECIMEN OUTDATE: NORMAL
WBC # BLD AUTO: 4.32 K/UL (ref 3.9–12.7)

## 2025-07-29 PROCEDURE — 85025 COMPLETE CBC W/AUTO DIFF WBC: CPT | Performed by: EMERGENCY MEDICINE

## 2025-07-29 PROCEDURE — 86901 BLOOD TYPING SEROLOGIC RH(D): CPT | Performed by: EMERGENCY MEDICINE

## 2025-07-29 PROCEDURE — 99285 EMERGENCY DEPT VISIT HI MDM: CPT | Mod: 25

## 2025-07-29 PROCEDURE — 80048 BASIC METABOLIC PNL TOTAL CA: CPT | Performed by: EMERGENCY MEDICINE

## 2025-07-29 PROCEDURE — 81025 URINE PREGNANCY TEST: CPT | Performed by: EMERGENCY MEDICINE

## 2025-07-29 RX ORDER — HYDROCODONE BITARTRATE AND ACETAMINOPHEN 500; 5 MG/1; MG/1
TABLET ORAL
Status: DISCONTINUED | OUTPATIENT
Start: 2025-07-30 | End: 2025-07-30 | Stop reason: HOSPADM

## 2025-07-30 VITALS
BODY MASS INDEX: 28.35 KG/M2 | HEART RATE: 58 BPM | SYSTOLIC BLOOD PRESSURE: 146 MMHG | HEIGHT: 63 IN | TEMPERATURE: 98 F | OXYGEN SATURATION: 100 % | WEIGHT: 160 LBS | RESPIRATION RATE: 17 BRPM | DIASTOLIC BLOOD PRESSURE: 82 MMHG

## 2025-07-30 LAB
ABO + RH BLD: NORMAL
ABO + RH BLD: NORMAL
ABORH RETYPE: NORMAL
ANISOCYTOSIS BLD QL SMEAR: NORMAL
BLD PROD TYP BPU: NORMAL
BLD PROD TYP BPU: NORMAL
BLOOD UNIT EXPIRATION DATE: NORMAL
BLOOD UNIT EXPIRATION DATE: NORMAL
BLOOD UNIT TYPE CODE: 6200
BLOOD UNIT TYPE CODE: 6200
CROSSMATCH INTERPRETATION: NORMAL
CROSSMATCH INTERPRETATION: NORMAL
DISPENSE STATUS: NORMAL
DISPENSE STATUS: NORMAL
HOLD SPECIMEN: NORMAL
HYPOCHROMIA BLD QL SMEAR: NORMAL
OVALOCYTES BLD QL SMEAR: NORMAL
POIKILOCYTOSIS BLD QL SMEAR: SLIGHT
SCHISTOCYTES BLD QL SMEAR: PRESENT
SPHEROCYTES BLD QL SMEAR: NORMAL
TARGETS BLD QL SMEAR: NORMAL
UNIT NUMBER: NORMAL
UNIT NUMBER: NORMAL

## 2025-07-30 PROCEDURE — 86920 COMPATIBILITY TEST SPIN: CPT | Performed by: EMERGENCY MEDICINE

## 2025-07-30 PROCEDURE — P9016 RBC LEUKOCYTES REDUCED: HCPCS | Performed by: EMERGENCY MEDICINE

## 2025-07-30 PROCEDURE — 36430 TRANSFUSION BLD/BLD COMPNT: CPT | Performed by: EMERGENCY MEDICINE
